# Patient Record
Sex: MALE | Race: WHITE
[De-identification: names, ages, dates, MRNs, and addresses within clinical notes are randomized per-mention and may not be internally consistent; named-entity substitution may affect disease eponyms.]

---

## 2018-05-08 NOTE — PT.OIE
Current Diagnoses

Primary osteoarthritis, left shoulder (05/07/18)
Muscle wasting and atrophy, not elsewhere classified, left shoulder (05/07/18)
Other reduced mobility (05/07/18)

Past Medical History (Last Updated 05/08/18 @ 13:13 by Florinda Denis, PT)

Back pain (Acute)
Falls (Acute)
Hearing decreased (Acute)
Neuropathy (Acute)

Past Surgical History (Last Updated 05/08/18 @ 13:12 by Florinda Denis, PT)

S/P shoulder replacement (Acute)

Physical Therapy Initial Evaluation

PT-OP-A Visit Information                             Start:  05/07/18 15:44
Freq:                                                 Status: Active        
Protocol:                                                                   
Activity Type Activity Date Activity User E-Sign Co-Sign Detail
Recorded Client Recorded Date Recorded By   
Document 05/07/18 10:40 LRN   
[id number] 05/07/18 16:15 LRN   

  05/07/18
  10:40
Out-Patient Physical Therapy Visit 
Information 
[Visit Information] 
 -Visit Type Treatment Note
 -Visit Start Time 10:37
 -Visit Stop Time 11:25
 -Total Visit Minutes 58
 -Visit Number 1
 -Number of PTA Visits 0
[Evaluation Information] 
 -Evaluation Date 05/07/18

PT-OP-C Subjective                                    Start:  05/07/18 15:44
Freq:                                                 Status: Active        
Protocol:                                                                   
Activity Type Activity Date Activity User E-Sign Co-Sign Detail
Recorded Client Recorded Date Recorded By   
Document 05/07/18 10:40 LRN   
[id number] 05/08/18 10:23 LRN   

  05/07/18
  10:40
OP-PT Subjective 
[Patient Comments] 
 -Patient Comments Doesn't really
 have pain, 4/10
 with movement
 8/10. at rest.
 Pain with
 movement
Patient Questionnaires 
[Quick Dash- Upper Extremity] 
 -Quick Dash UE Score 79.54
 -Quick Dash UE Impairment 80 to 99%
 Impaired (Score
 80-99)
OP-PT Pain Assessment 
[Pain Assessment Grid] 
 -Paper Pain Assessment Grid Completed Yes
[Location] 
 Left Shoulder 
  -Intensity 4
  -Scale Used Numeric (1 - 10
 )
  -Pain Aggravating Factors Changing
 Position
  -Patient Stated Pain Goal Regain use of
 arm

PT-OP-J Posture/Palpation/Skin                        Start:  05/07/18 15:44
Freq:                                                 Status: Active        
Protocol:                                                                   
Activity Type Activity Date Activity User E-Sign Co-Sign Detail
Recorded Client Recorded Date Recorded By   
Document 05/08/18 10:24 LRN   
[id number] 05/08/18 10:28 LRN   

  05/08/18
  10:24
Posture Evaluation 
[Position] 
 Sitting 
  -Evaluation View Anterior
  -Head/C-Spine Posture Neutral
 Position
  -Scapula Posture (L) Depressed
[Comments] 
 -Posture Comments Sitting:  Pt
 sits slumped
 with
 straightened
 thoracic spine.
 L scapula is
 tightly bound
 to the ribcage.
 There is a
 moderate
 Dowager's hump.
 Moderate
 forward head.
 Standing: hips
 flexed ~10 deg'
 s.
Palpation Assessment 
[Location] 
 Three 
  -Palpation Location Posterior L
 shoulder
  -Palpation Findings Tenderness
 Two 
  -Palpation Location Upper shoulder
  -Palpation Findings Muscle Guarding
 One 
  -Palpation Location L side neck
  -Palpation Findings Muscle Guarding
Skin Assessment 
[Incisional Assessment] 
 -Incision Appearance/Comments Unable to
 assess.  Scar
 covered with
 waterproof
 bandage (
 Aquacil).

PT-OP-K Range of Motion                               Start:  05/07/18 15:44
Freq:                                                 Status: Active        
Protocol:                                                                   
Activity Type Activity Date Activity User E-Sign Co-Sign Detail
Recorded Client Recorded Date Recorded By   
Document 05/07/18 10:40 [facility]   
[id number] 05/08/18 12:21 LRN   

  05/07/18
  10:40
Cervical Spine Range of Motion 
[Cervical Spine] 
 
[id number]|2018-08-20 14:32:42|2018-08-20 14:32:42|PT.OPPN||||

## 2018-05-10 NOTE — PT.OTN
Current Diagnoses

Primary osteoarthritis, left shoulder (05/10/18)

Physical Therapy Treatment Note

PT-OP-A Visit Information                                  Start:  05/07/18 15:44
Freq:                                                      Status: Active        
Protocol:                                                                        
 Document     05/10/18 12:56  LRN  (Rec: 05/10/18 14:34  LRN  [id number])
 Out-Patient Physical Therapy Visit Information
     Visit Information
      Visit Type                                 Treatment Note
      Visit Note                                 2/10
      Visit Start Time                           12:50
      Visit Stop Time                            14:15
      Total Visit Minutes                        85
      Visit Number                               2
      Number of PTA Visits                       0
     Evaluation Information
      Evaluation Date                            05/07/18
PT-OP-C Subjective                                         Start:  05/07/18 15:44
Freq:                                                      Status: Active        
Protocol:                                                                        
 Document     05/10/18 12:56  LRN  (Rec: 05/10/18 14:34  LRN  [id number])
 OP-PT Subjective
     Patient Comments
      Patient Comments                           Has been doing HEP 2x/day.
                                                 Only pain is in the front of
                                                 the shoulder.
PT-OP-J Posture/Palpation/Skin                             Start:  05/07/18 15:44
Freq:                                                      Status: Active        
Protocol:                                                                        
 Document     05/07/18 10:40  LRN  (Rec: 05/08/18 10:28  LRN  [id number])
 Posture Evaluation
     Position
      Sitting
       Evaluation View                           Anterior
       Head/C-Spine Posture                      Neutral Position
       Scapula Posture                           (L) Depressed
     Comments
      Posture Comments                           Sitting:  Pt sits slumped with
                                                 straightened thoracic spine.
                                                 L scapula is   tightly bound
                                                 to the ribcage. There is a
                                                 moderate Dowager's hump.
                                                 Moderate forward head.
                                                 Standing: hips flexed ~10 deg'
                                                 s.
 Palpation Assessment
     Location
      Three
       Palpation Location                        Posterior L shoulder
       Palpation Findings                        Tenderness
      Two
       Palpation Location                        Upper shoulder
       Palpation Findings                        Muscle Guarding
      One
       Palpation Location                        L side neck
       Palpation Findings                        Muscle Guarding
 Skin Assessment
     Incisional Assessment
      Incision Appearance/Comments               Unable to assess.  Scar
                                                 covered with waterproof
                                                 bandage (Aquacil).
PT-OP-K Range of Motion                                    Start:  05/07/18 15:44
Freq:                                                      Status: Active        
Protocol:                                                                        
 Document     05/10/18 12:56  LRN  (Rec: 05/10/18 14:34  LRN  [id number])
 Shoulder Goniometric Range of Motion
     Shoulder
      Measured in Degrees
       Left
        Testing Position                         Supine
        Flexion                                  40

[id number]|2018-07-02 13:27:37|2018-07-02 13:27:37|PT.OTN||||

## 2018-05-22 NOTE — PT.OTN
Current Diagnoses

Primary osteoarthritis, left shoulder (05/22/18)

Physical Therapy Treatment Note

PT-OP-A Visit Information                                  Start:  05/07/18 15:44
Freq:                                                      Status: Active        
Protocol:                                                                        
 Document     05/22/18 13:33  LRN  (Rec: 05/22/18 14:33  LRN  [id number])
 Out-Patient Physical Therapy Visit Information
     Visit Information
      Visit Type                                 Treatment Note
      Visit Note                                 4/10
      Visit Start Time                           13:33
      Visit Stop Time                            14:25
      Total Visit Minutes                        52
      Visit Number                               4
      Number of PTA Visits                       0
     Evaluation Information
      Evaluation Date                            05/07/18
PT-OP-C Subjective                                         Start:  05/07/18 15:44
Freq:                                                      Status: Active        
Protocol:                                                                        
 Document     05/22/18 13:33  LRN  (Rec: 05/22/18 14:33  LRN  [id number])
 OP-PT Subjective
     Patient Comments
      Patient Comments                           Everything is going good.  Has
                                                 no pain complaints.  Notes
                                                 clicking in the shoulder with
                                                 lifting of the arm motion.
PT-OP-J Posture/Palpation/Skin                             Start:  05/07/18 15:44
Freq:                                                      Status: Active        
Protocol:                                                                        
 Document     05/07/18 10:40  LRN  (Rec: 05/08/18 10:28  LRN  [id number])
 Posture Evaluation
     Position
      Sitting
       Evaluation View                           Anterior
       Head/C-Spine Posture                      Neutral Position
       Scapula Posture                           (L) Depressed
     Comments
      Posture Comments                           Sitting:  Pt sits slumped with
                                                 straightened thoracic spine.
                                                 L scapula is   tightly bound
                                                 to the ribcage. There is a
                                                 moderate Dowager's hump.
                                                 Moderate forward head.
                                                 Standing: hips flexed ~10 deg'
                                                 s.
 Palpation Assessment
     Location
      Three
       Palpation Location                        Posterior L shoulder
       Palpation Findings                        Tenderness
      Two
       Palpation Location                        Upper shoulder
       Palpation Findings                        Muscle Guarding
      One
       Palpation Location                        L side neck
       Palpation Findings                        Muscle Guarding
 Skin Assessment
     Incisional Assessment
      Incision Appearance/Comments               Unable to assess.  Scar
                                                 covered with waterproof
                                                 bandage (Aquacil).
PT-OP-K Range of Motion                                    Start:  05/07/18 15:44
Freq:                                                      Status: Active        
Protocol:                                                                        
 Document     05/22/18 13:33  LRN  (Rec: 05/22/18 14:33  LRN  [id number])
 Shoulder Goniometric Range of Motion
     Shoulder
      Measured in Degrees
       Left
        Testing
[id number]|2018-09-10 15:44:00|2018-09-10 15:44:00|PT.OPDS||||

## 2018-06-22 ENCOUNTER — HOSPITAL ENCOUNTER (OUTPATIENT)
Age: 83
End: 2018-06-22
Payer: MEDICARE

## 2018-06-22 DIAGNOSIS — R73.01: ICD-10-CM

## 2018-06-22 DIAGNOSIS — I10: ICD-10-CM

## 2018-06-22 DIAGNOSIS — E78.00: ICD-10-CM

## 2018-06-22 DIAGNOSIS — I48.91: Primary | ICD-10-CM

## 2018-06-22 LAB
ADD MANUAL DIFF / SLIDE REVIEW: NO
ALBUMIN SERPL-MCNC: 4.4 G/DL (ref 3.5–5)
ALBUMIN/GLOB SERPL: 1.3 {RATIO} (ref 1–2.8)
ALP SERPL-CCNC: 59 U/L (ref 38–126)
ALT SERPL-CCNC: 34 IU/L (ref 21–72)
BUN SERPL-MCNC: 22 MG/DL (ref 9–20)
CALCIUM SERPL-MCNC: 9.3 MG/DL (ref 8.4–10.2)
CHLORIDE SERPL-SCNC: 102 MMOL/L (ref 98–107)
CHOLEST SERPL-MCNC: 133 MG/DL (ref 140–199)
CO2 SERPL-SCNC: 27 MMOL/L (ref 22–32)
ESTIMATED GLOMERULAR FILT RATE: > 60 ML/MIN (ref 60–?)
GLOBULIN SER CALC-MCNC: 3.4 G/DL (ref 1.7–4.1)
GLUCOSE SERPL-MCNC: 120 MG/DL (ref 80–110)
HBA1C MFR BLD: 6.2 % (ref 4–6)
HDLC SERPL-MCNC: 62 MG/DL (ref 40–60)
HEMATOCRIT: 42.1 % (ref 41–53)
HEMOGLOBIN: 14.2 G/DL (ref 13.5–17.5)
HEMOLYSIS: < 15 (ref 0–50)
MCV RBC: 89.2 FL (ref 80–100)
MEAN CORPUSCULAR HEMOGLOBIN: 30 PG (ref 26–34)
MEAN CORPUSCULAR HGB CONC: 33.6 % (ref 30–36)
PLATELET COUNT: 193 X10^3/UL (ref 150–400)
POTASSIUM SERPL-SCNC: 4.2 MMOL/L (ref 3.4–5.1)
PROT SERPL-MCNC: 7.8 G/DL (ref 6.3–8.2)
SODIUM SERPL-SCNC: 141 MMOL/L (ref 137–145)
TRIGL SERPL-MCNC: 87 MG/DL (ref 35–150)

## 2018-06-22 PROCEDURE — 85025 COMPLETE CBC W/AUTO DIFF WBC: CPT

## 2018-06-22 PROCEDURE — 36415 COLL VENOUS BLD VENIPUNCTURE: CPT

## 2018-06-22 PROCEDURE — 80053 COMPREHEN METABOLIC PANEL: CPT

## 2018-06-22 PROCEDURE — 80061 LIPID PANEL: CPT

## 2018-06-22 PROCEDURE — 83036 HEMOGLOBIN GLYCOSYLATED A1C: CPT

## 2018-07-02 NOTE — PT.OPPOC
Current Diagnoses

Primary osteoarthritis, left shoulder (07/02/18)

Provider

Visit Care Team          Role                          Provider Type
Ángel Gonzalez MD
                         Primary Care Provider         Physician
     Specialty:     Internal Medicine
     Address:       [address] [place] [address], [address]
     Phone:         [phone]
     Fax:           [phone]
     Email:         


Del Coburn PA-C
                         Attending Provider            Advanced Practioner Clinician
     Specialty:     Orthopedic Surgery
     Address:       [address] [place] [address], [address]
     Phone:         [phone]
     Fax:           [phone]
     Email:         [email or web address]




Plan Of Care

PT-OP-T Assessment and Plan                                Start:  05/07/18 15:44
Freq:                                                      Status: Active        
Protocol:                                                                        
 Document     07/02/18 11:25  LRN  (Rec: 07/02/18 12:29  LRN  [id number])
 Physical Therapy Assessment
     Rehab Potential
      Rehabilitation Potential                   Excellent
     Impairments
      Impairments                                Activity Tolerance
                                                 Functional Activities
                                                 Functional Mobility
                                                 Pain
                                                 ROM
                                                 Soft Tissue Mobility
                                                 Strength
     Other Concerns
      Age Related Concerns                       Needs assist with ex's,
                                                 hearing difficulty, pictures
                                                 with exercises.
      Barriers to Rehabilitation                 65+ Age
     Goals
      Eight
       Impairment                                Lacks approriate HEP
       Long Term Goal (LTG)                      Pt will be independent in a
                                                 HEP
       LTG Duration                              8/20/18
      Six
       Impairment                                Decreased function
       Long Term Goal (LTG)                      UE QuickDASH Score no more
                                                 than 20% impairment.
       LTG Duration                              8/10/18
      Five
       Impairment                                Decreased functional use of L
                                                 shoulder
       Long Term Goal (LTG)                      Per protocol: Start
                                                 scapulothoracic rhythmic
                                                 strengthening/stabilization &
                                                 alternating isometrics in
                                                 supine for pt to begin use of
                                                 hand for feeding and light
                                                 activities of ADL's (dressing,
                                                 washing).
       LTG Duration                              Goal met
      Four
       Impairment                                Decreased L shoulder IR
       Long Term Goal (LTG)                      Per protocol:
                                                 PROM of IR for L shoulder not
                                                 to exceed 50 deg's.
                                                 Start ER/IR isometrics with
                                                 light weight 1-3# or light
                                                 resistive bands.
       LTG Duration                              8/10/18
      Three
       Impairment       
[id number]|2018-06-04 15:19:13|2018-06-04 15:19:13|PT.OTN||||

## 2018-07-02 NOTE — PT.OPPN
Current Diagnoses

Primary osteoarthritis, left shoulder (07/02/18)

Physical Therapy Progress Note

PT-OP-A Visit Information                                  Start:  05/07/18 15:44
Freq:                                                      Status: Active        
Protocol:                                                                        
 Document     07/02/18 11:25  LRN  (Rec: 07/02/18 12:29  LRN  [id number])
 Out-Patient Physical Therapy Visit Information
     Visit Information
      Visit Type                                 Progress Note
      Visit Note                                 10/10
      Visit Start Time                           11:25
      Visit Stop Time                            12:20
      Total Visit Minutes                        55
      Visit Number                               10
      Number of PTA Visits                       0
     Evaluation Information
      Evaluation Date                            05/07/18
PT-OP-C Subjective                                         Start:  05/07/18 15:44
Freq:                                                      Status: Active        
Protocol:                                                                        
 Document     07/02/18 11:25  LRN  (Rec: 07/02/18 12:29  LRN  [id number])
 OP-PT Subjective
     Patient Comments
      Patient Comments                           Lifted a food  that
                                                 didn't feel heavy, but caused
                                                 discomfort later.
 Patient Questionnaires
     Quick Dash- Upper Extremity
      Quick Dash UE Score                        25
      Quick Dash UE Impairment                   20 to 39% Impaired (Score 20-
                                                 39)
PT-OP-J Posture/Palpation/Skin                             Start:  05/07/18 15:44
Freq:                                                      Status: Active        
Protocol:                                                                        
 Document     05/07/18 10:40  LRN  (Rec: 05/08/18 10:28  LRN  [id number])
 Posture Evaluation
     Position
      Sitting
       Evaluation View                           Anterior
       Head/C-Spine Posture                      Neutral Position
       Scapula Posture                           (L) Depressed
     Comments
      Posture Comments                           Sitting:  Pt sits slumped with
                                                 straightened thoracic spine.
                                                 L scapula is   tightly bound
                                                 to the ribcage. There is a
                                                 moderate Dowager's hump.
                                                 Moderate forward head.
                                                 Standing: hips flexed ~10 deg'
                                                 s.
 Palpation Assessment
     Location
      Three
       Palpation Location                        Posterior L shoulder
       Palpation Findings                        Tenderness
      Two
       Palpation Location                        Upper shoulder
       Palpation Findings                        Muscle Guarding
      One
       Palpation Location                        L side neck
       Palpation Findings                        Muscle Guarding
 Skin Assessment
     Incisional Assessment
      Incision Appearance/Comments               Unable to assess.  Scar
                                                 covered with waterproof
                                                 bandage (Aquacil).
PT-OP-K Range of Motion                                    Start:  05/07/18 15:44
Freq:                                                      Status: Active        
Protocol:                                                                        
 Document     07/02/18 1
[id number]|2018-09-10 15:43:28|2018-09-10 15:43:28|PT.OTN||||

## 2018-07-09 NOTE — PT.OTN
"
Current Diagnoses

Primary osteoarthritis, left shoulder (07/09/18)

Physical Therapy Treatment Note

PT-OP-A Visit Information                                  Start:  05/07/18 15:44
Freq:                                                      Status: Active        
Protocol:                                                                        
 Document     07/09/18 11:15  LRN  (Rec: 07/09/18 11:33  LRN  [id number])
 Out-Patient Physical Therapy Visit Information
     Visit Information
      Visit Type                                 Treatment Note
      Visit Note                                 11/20
      Visit Start Time                           11:15
      Visit Stop Time                            12:05
      Total Visit Minutes                        50
      Visit Number                               11
      Number of PTA Visits                       0
     Evaluation Information
      Evaluation Date                            05/07/18
PT-OP-C Subjective                                         Start:  05/07/18 15:44
Freq:                                                      Status: Active        
Protocol:                                                                        
 Document     07/09/18 11:15  LRN  (Rec: 07/09/18 11:33  LRN  [id number])
 OP-PT Subjective
     Patient Comments
      Patient Comments                           States he stopped neck ex's
                                                 because he had sudden onset of
                                                 neck pain and had a
                                                 chiroprator friend work with
                                                 him for 3 visits to get his
                                                 pain down.  He is doing his L
                                                 arm ex's with a cane.
PT-OP-J Posture/Palpation/Skin                             Start:  05/07/18 15:44
Freq:                                                      Status: Active        
Protocol:                                                                        
 Document     05/07/18 10:40  LRN  (Rec: 05/08/18 10:28  LRN  [id number])
 Posture Evaluation
     Position
      Sitting
       Evaluation View                           Anterior
       Head/C-Spine Posture                      Neutral Position
       Scapula Posture                           (L) Depressed
     Comments
      Posture Comments                           Sitting:  Pt sits slumped with
                                                 straightened thoracic spine.
                                                 L scapula is   tightly bound
                                                 to the ribcage. There is a
                                                 moderate Dowager's hump.
                                                 Moderate forward head.
                                                 Standing: hips flexed ~10 deg'
                                                 s.
 Palpation Assessment
     Location
      Three
       Palpation Location                        Posterior L shoulder
       Palpation Findings                        Tenderness
      Two
       Palpation Location                        Upper shoulder
       Palpation Findings                        Muscle Guarding
      One
       Palpation Location                        L side neck
       Palpation Findings                        Muscle Guarding
 Skin Assessment
     Incisional Assessment
      Incision Appearance/Comments               Unable to assess.  Scar
                                                 covered with waterproof
                                                 bandage (Aquacil).
PT-OP-K Range of Motion                                    Start:  05/07/18 15:44
Freq:                                                      Status: Active        
Protocol:                                
[id number]|2018-10-11 08:53:31|2018-10-11 08:53:31|PM.HP.1||||"History of Present Illness

## 2018-07-16 NOTE — PT.OTN
Current Diagnoses

Primary osteoarthritis, left shoulder (07/16/18)

Physical Therapy Treatment Note

PT-OP-A Visit Information                                  Start:  05/07/18 15:44
Freq:                                                      Status: Active        
Protocol:                                                                        
 Document     07/16/18 11:17  LRN  (Rec: 07/16/18 12:38  LRN  [id number])
 Out-Patient Physical Therapy Visit Information
     Visit Information
      Visit Type                                 Treatment Note
      Visit Note                                 12/20
      Visit Start Time                           11:17
      Visit Stop Time                            12:07
      Total Visit Minutes                        50
      Visit Number                               12
      Number of PTA Visits                       0
     Evaluation Information
      Evaluation Date                            05/07/18
PT-OP-C Subjective                                         Start:  05/07/18 15:44
Freq:                                                      Status: Active        
Protocol:                                                                        
 Document     07/16/18 11:17  LRN  (Rec: 07/16/18 14:49  LRN  [id number])
 OP-PT Subjective
     Patient Comments
      Patient Comments                           Able to get L arm up high with
                                                 or without a cane.
PT-OP-J Posture/Palpation/Skin                             Start:  05/07/18 15:44
Freq:                                                      Status: Active        
Protocol:                                                                        
 Document     05/07/18 10:40  LRN  (Rec: 05/08/18 10:28  LRN  [id number])
 Posture Evaluation
     Position
      Sitting
       Evaluation View                           Anterior
       Head/C-Spine Posture                      Neutral Position
       Scapula Posture                           (L) Depressed
     Comments
      Posture Comments                           Sitting:  Pt sits slumped with
                                                 straightened thoracic spine.
                                                 L scapula is   tightly bound
                                                 to the ribcage. There is a
                                                 moderate Dowager's hump.
                                                 Moderate forward head.
                                                 Standing: hips flexed ~10 deg'
                                                 s.
 Palpation Assessment
     Location
      Three
       Palpation Location                        Posterior L shoulder
       Palpation Findings                        Tenderness
      Two
       Palpation Location                        Upper shoulder
       Palpation Findings                        Muscle Guarding
      One
       Palpation Location                        L side neck
       Palpation Findings                        Muscle Guarding
 Skin Assessment
     Incisional Assessment
      Incision Appearance/Comments               Unable to assess.  Scar
                                                 covered with waterproof
                                                 bandage (Aquacil).
PT-OP-K Range of Motion                                    Start:  05/07/18 15:44
Freq:                                                      Status: Active        
Protocol:                                                                        
 Document     07/16/18 11:17  LRN  (Rec: 07/16/18 12:38  LRN  [id number])
 Shoulder Goniometric Range of Motion
     Shoulder
      Measured in Degrees
       Right Active
        Testing Position                         Sitting
        Flexion                                  155
        Abduction                                180
     
[id number]|2018-07-23 13:07:32|2018-07-23 13:07:32|PT.OTN||||

## 2018-07-30 NOTE — PT.OTN
Current Diagnoses

Primary osteoarthritis, left shoulder (07/30/18)

Physical Therapy Treatment Note

PT-OP-A Visit Information                                  Start:  05/07/18 15:44
Freq:                                                      Status: Active        
Protocol:                                                                        
 Document     07/30/18 10:30  AMB  (Rec: 07/30/18 11:22  AMB  [id number])
 Out-Patient Physical Therapy Visit Information
     Visit Information
      Visit Type                                 Treatment Note
      Visit Note                                 14/20
      Visit Start Time                           10:30
      Visit Stop Time                            11:20
      Total Visit Minutes                        55
      Visit Number                               14
      Number of PTA Visits                       0
     Evaluation Information
      Evaluation Date                            05/07/18
PT-OP-C Subjective                                         Start:  05/07/18 15:44
Freq:                                                      Status: Active        
Protocol:                                                                        
 Document     07/30/18 10:30  AMB  (Rec: 07/30/18 11:22  AMB  [id number])
 OP-PT Subjective
     Patient Comments
      Patient Comments                           Pt saw MD who said he will
                                                 likely not have equal ROM, but
                                                 he should focus on getting a
                                                 HEP and gym program that he
                                                 can be independent with.
PT-OP-J Posture/Palpation/Skin                             Start:  05/07/18 15:44
Freq:                                                      Status: Active        
Protocol:                                                                        
 Document     05/07/18 10:40  LRN  (Rec: 05/08/18 10:28  LRN  [id number])
 Posture Evaluation
     Position
      Sitting
       Evaluation View                           Anterior
       Head/C-Spine Posture                      Neutral Position
       Scapula Posture                           (L) Depressed
     Comments
      Posture Comments                           Sitting:  Pt sits slumped with
                                                 straightened thoracic spine.
                                                 L scapula is   tightly bound
                                                 to the ribcage. There is a
                                                 moderate Dowager's hump.
                                                 Moderate forward head.
                                                 Standing: hips flexed ~10 deg'
                                                 s.
 Palpation Assessment
     Location
      Three
       Palpation Location                        Posterior L shoulder
       Palpation Findings                        Tenderness
      Two
       Palpation Location                        Upper shoulder
       Palpation Findings                        Muscle Guarding
      One
       Palpation Location                        L side neck
       Palpation Findings                        Muscle Guarding
 Skin Assessment
     Incisional Assessment
      Incision Appearance/Comments               Unable to assess.  Scar
                                                 covered with waterproof
                                                 bandage (Aquacil).
PT-OP-K Range of Motion                                    Start:  05/07/18 15:44
Freq:                                                      Status: Active        
Protocol:                                                                        
 Document     07/23/18 11:15  LRN  (Rec: 07/23/18 12:46  LRN  [id number])
 Shoulder Goniometric Range of Mo
[id number]|2018-06-11 16:13:55|2018-06-11 16:13:55|PT.OTN||||

## 2018-08-06 NOTE — PT.OTN
Current Diagnoses

Primary osteoarthritis, left shoulder (08/06/18)

Physical Therapy Treatment Note

PT-OP-A Visit Information                                  Start:  05/07/18 15:44
Freq:                                                      Status: Active        
Protocol:                                                                        
 Document     08/06/18 11:20  LRN  (Rec: 08/06/18 12:38  LRN  [id number])
 Out-Patient Physical Therapy Visit Information
     Visit Information
      Visit Type                                 Treatment Note
      Visit Note                                 15/20
      Visit Start Time                           11:20
      Visit Stop Time                            12:10
      Total Visit Minutes                        50
      Visit Number                               15
      Number of PTA Visits                       0
     Evaluation Information
      Evaluation Date                            05/07/18
PT-OP-C Subjective                                         Start:  05/07/18 15:44
Freq:                                                      Status: Active        
Protocol:                                                                        
 Document     08/06/18 11:20  LRN  (Rec: 08/06/18 12:38  LRN  [id number])
 OP-PT Subjective
     Patient Comments
      Patient Comments                           Is now able to put wallet into
                                                 the back pocket.  States MD
                                                 was okay with him being
                                                 supervised with ex's and to
                                                 cont PT.
PT-OP-J Posture/Palpation/Skin                             Start:  05/07/18 15:44
Freq:                                                      Status: Active        
Protocol:                                                                        
 Document     05/07/18 10:40  LRN  (Rec: 05/08/18 10:28  LRN  [id number])
 Posture Evaluation
     Position
      Sitting
       Evaluation View                           Anterior
       Head/C-Spine Posture                      Neutral Position
       Scapula Posture                           (L) Depressed
     Comments
      Posture Comments                           Sitting:  Pt sits slumped with
                                                 straightened thoracic spine.
                                                 L scapula is   tightly bound
                                                 to the ribcage. There is a
                                                 moderate Dowager's hump.
                                                 Moderate forward head.
                                                 Standing: hips flexed ~10 deg'
                                                 s.
 Palpation Assessment
     Location
      Three
       Palpation Location                        Posterior L shoulder
       Palpation Findings                        Tenderness
      Two
       Palpation Location                        Upper shoulder
       Palpation Findings                        Muscle Guarding
      One
       Palpation Location                        L side neck
       Palpation Findings                        Muscle Guarding
 Skin Assessment
     Incisional Assessment
      Incision Appearance/Comments               Unable to assess.  Scar
                                                 covered with waterproof
                                                 bandage (Aquacil).
PT-OP-K Range of Motion                                    Start:  05/07/18 15:44
Freq:                                                      Status: Active        
Protocol:                                                                        
 Document     07/23/18 11:15  LRN  (Rec: 07/23/18 12:46  LRN  [id number])
 Shoulder Goniometric Range of Motion
     Shoulder
[id number]|2018-06-20 12:48:43|2018-06-20 12:48:43|PT.OTN||||

## 2018-08-10 NOTE — PT.OTN
Current Diagnoses

Primary osteoarthritis, left shoulder (08/10/18)

Physical Therapy Treatment Note

PT-OP-A Visit Information                                  Start:  05/07/18 15:44
Freq:                                                      Status: Active        
Protocol:                                                                        
 Document     08/10/18 09:44  LRN  (Rec: 08/10/18 10:29  LRN  [id number])
 Out-Patient Physical Therapy Visit Information
     Visit Information
      Visit Type                                 Treatment Note
      Visit Note                                 16/20
      Visit Start Time                           09:44
      Visit Stop Time                            10:36
      Total Visit Minutes                        52
      Visit Number                               16
      Number of PTA Visits                       0
     Evaluation Information
      Evaluation Date                            05/07/18
PT-OP-C Subjective                                         Start:  05/07/18 15:44
Freq:                                                      Status: Active        
Protocol:                                                                        
 Document     08/10/18 09:44  LRN  (Rec: 08/10/18 10:29  LRN  [id number])
 OP-PT Subjective
     Patient Comments
      Patient Comments                           Happy to be able to seat belt
                                                 himself and put his wallet in
                                                 his back pocket
PT-OP-J Posture/Palpation/Skin                             Start:  05/07/18 15:44
Freq:                                                      Status: Active        
Protocol:                                                                        
 Document     05/07/18 10:40  LRN  (Rec: 05/08/18 10:28  LRN  [id number])
 Posture Evaluation
     Position
      Sitting
       Evaluation View                           Anterior
       Head/C-Spine Posture                      Neutral Position
       Scapula Posture                           (L) Depressed
     Comments
      Posture Comments                           Sitting:  Pt sits slumped with
                                                 straightened thoracic spine.
                                                 L scapula is   tightly bound
                                                 to the ribcage. There is a
                                                 moderate Dowager's hump.
                                                 Moderate forward head.
                                                 Standing: hips flexed ~10 deg'
                                                 s.
 Palpation Assessment
     Location
      Three
       Palpation Location                        Posterior L shoulder
       Palpation Findings                        Tenderness
      Two
       Palpation Location                        Upper shoulder
       Palpation Findings                        Muscle Guarding
      One
       Palpation Location                        L side neck
       Palpation Findings                        Muscle Guarding
 Skin Assessment
     Incisional Assessment
      Incision Appearance/Comments               Unable to assess.  Scar
                                                 covered with waterproof
                                                 bandage (Aquacil).
PT-OP-K Range of Motion                                    Start:  05/07/18 15:44
Freq:                                                      Status: Active        
Protocol:                                                                        
 Document     07/23/18 11:15  LRN  (Rec: 07/23/18 12:46  LRN  [id number])
 Shoulder Goniometric Range of Motion
     Shoulder
      Measured in Degrees
       Left Active
        Testing Position                         Sitting
        Flexion                       
[id number]|2018-06-27 16:38:15|2018-06-27 16:38:15|PT.OTN||||

## 2018-08-13 NOTE — PT.OTN
Current Diagnoses

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

Physical Therapy Treatment Note

PT-OP-A Visit Information                                  Start:  05/07/18 15:44
Freq:                                                      Status: Active        
Protocol:                                                                        
 Document     08/13/18 11:18  LRN  (Rec: 08/13/18 12:24  LRN  [id number])
 Out-Patient Physical Therapy Visit Information
     Visit Information
      Visit Type                                 Treatment Note
      Visit Note                                 17/20
      Visit Start Time                           11:18
      Visit Stop Time                            12:18
      Total Visit Minutes                        60
      Visit Number                               17
      Number of PTA Visits                       0
     Evaluation Information
      Evaluation Date                            05/07/18
PT-OP-C Subjective                                         Start:  05/07/18 15:44
Freq:                                                      Status: Active        
Protocol:                                                                        
 Document     08/13/18 11:18  LRN  (Rec: 08/13/18 12:46  LRN  [id number])
 OP-PT Subjective
     Patient Comments
      Patient Comments                           Would like to review his HEP.
                                                 Was scrapping his walls to
                                                 get ready to pain but using
                                                 his R arm only.
PT-OP-J Posture/Palpation/Skin                             Start:  05/07/18 15:44
Freq:                                                      Status: Active        
Protocol:                                                                        
 Document     05/07/18 10:40  LRN  (Rec: 05/08/18 10:28  LRN  [id number])
 Posture Evaluation
     Position
      Sitting
       Evaluation View                           Anterior
       Head/C-Spine Posture                      Neutral Position
       Scapula Posture                           (L) Depressed
     Comments
      Posture Comments                           Sitting:  Pt sits slumped with
                                                 straightened thoracic spine.
                                                 L scapula is   tightly bound
                                                 to the ribcage. There is a
                                                 moderate Dowager's hump.
                                                 Moderate forward head.
                                                 Standing: hips flexed ~10 deg'
                                                 s.
 Palpation Assessment
     Location
      Three
       Palpation Location                        Posterior L shoulder
       Palpation Findings                        Tenderness
      Two
       Palpation Location                        Upper shoulder
       Palpation Findings                        Muscle Guarding
      One
       Palpation Location                        L side neck
       Palpation Findings                        Muscle Guarding
 Skin Assessment
     Incisional Assessment
      Incision Appearance/Comments               Unable to assess.  Scar
                                                 covered with waterproof
                                                 bandage (Aquacil).
PT-OP-K Range of Motion                                    Start:  05/07/18 15:44
Freq:                                                      Status: Active        
Protocol:                                                                        
 Document     07/23/18 11:15  LRN  (Rec: 07/23/18 12:46  LRN  [id number])
 Shoulder Goniometric Range of Motion
     Shoulder
      Measured in Degrees
       Left Active
        Testing Positio
[id number]|2018-05-15 16:33:30|2018-05-15 16:33:30|PT.OTN||||

## 2018-08-20 NOTE — PT.OPPOC
Current Diagnoses

Primary osteoarthritis, left shoulder (08/20/18)

Provider

Visit Care Team          Role                          Provider Type
Ángel Gonzalez MD
                         Primary Care Provider         Physician
     Specialty:     Internal Medicine
     Address:       [address] [place] [address], [address]
     Phone:         [phone]
     Fax:           [phone]
     Email:         


Del Coburn PA-C
                         Attending Provider            Advanced Practice Clinician
     Specialty:     Orthopedic Surgery
     Address:       [address] [place] [address], [place], [place], [address]
     Phone:         [phone]
     Fax:           [phone]
     Email:         [email or web address]




Plan Of Care

PT-OP-T Assessment and Plan                                Start:  05/07/18 15:44
Freq:                                                      Status: Active        
Protocol:                                                                        
 Document     08/20/18 11:15  LRN  (Rec: 08/20/18 12:49  LRN  [id number])
 Physical Therapy Assessment
     Impairments
      Impairments                                Activity Tolerance
                                                 Functional Activities
                                                 ROM
                                                 Strength
     Other Concerns
      Age Related Concerns                       Hearing difficulty, pictures
                                                 with exercises.
      Barriers to Rehabilitation                 65+ Age
     Goals
      Eight
       Impairment                                Lacks appropriate HEP
       Long Term Goal (LTG)                      Pt will be independent in a
                                                 HEP
       LTG Duration                              8/20/18
      Six
       Impairment                                Decreased function
       Long Term Goal (LTG)                      UE QuickDASH Score no more
                                                 than 20% impairment.
       LTG Duration                              8/20/18: GOAL MET.  Score is
                                                 13% disability
      Five
       Impairment                                Decreased functional use of L
                                                 shoulder
       LTG Duration                              GOAL MET
      Four
       Impairment                                Decreased L shoulder IR
       LTG Duration                              7/16/18: GOAL MET.
      Three
       Impairment                                Decreased L shoulder strength
       Short Term Goal (STG)                     Per protocol: Pt able to lift
                                                 light weights (1-3#) in
                                                 scapular plane for flex.
                                                 PRECAUTION:   No lifting > 6#
                                                 or sudden lifting/pushing.
       STG Duration                              9/28/18
       Long Term Goal (LTG)                      Proper scapulohumeral rhythm
                                                 with lifting of L arm.
       LTG Duration                              9/28/18
      Two
       Impairment                                Decreased L shoulder AROM
       Long Term Goal (LTG)                      Shoulder AROM up to 120 deg's
                                                 and ER of 30 deg's with proper
                                                 shoulder mechanics.
       LTG Duration                              8/20/18
      One
       Impairment                                Decreased L shoulder PROM.  Pt
                                                 to be in sling 6-8 wks.
       STG Duration                    
[id number]|2018-05-29 16:09:56|2018-05-29 16:09:56|PT.OTN||||

## 2018-09-04 NOTE — PT.OTN
Current Diagnoses

Primary osteoarthritis, left shoulder (09/04/18)

Physical Therapy Treatment Note

PT-OP-A Visit Information                                  Start:  05/07/18 15:44
Freq:                                                      Status: Active        
Protocol:                                                                        
 Document     09/04/18 11:17  LRN  (Rec: 09/04/18 12:16  LRN  [id number])
 Out-Patient Physical Therapy Visit Information
     Visit Information
      Visit Type                                 Treatment Note
      Visit Note                                 19/20
      Visit Start Time                           11:17
      Visit Stop Time                            12:07
      Total Visit Minutes                        50
      Visit Number                               19
      Number of PTA Visits                       0
     Evaluation Information
      Evaluation Date                            05/07/18
PT-OP-C Subjective                                         Start:  05/07/18 15:44
Freq:                                                      Status: Active        
Protocol:                                                                        
 Document     09/04/18 11:17  LRN  (Rec: 09/04/18 12:16  LRN  [id number])
 OP-PT Subjective
     Patient Comments
      Patient Comments                           Will be painting and sanding
                                                 with R arm.  Was able to do
                                                 some gardening work  (hedge
                                                 trimming)  was lighter than I
                                                 thought it would be. 
PT-OP-J Posture/Palpation/Skin                             Start:  05/07/18 15:44
Freq:                                                      Status: Active        
Protocol:                                                                        
 Document     05/07/18 10:40  LRN  (Rec: 05/08/18 10:28  LRN  [id number])
 Posture Evaluation
     Position
      Sitting
       Evaluation View                           Anterior
       Head/C-Spine Posture                      Neutral Position
       Scapula Posture                           (L) Depressed
     Comments
      Posture Comments                           Sitting:  Pt sits slumped with
                                                 straightened thoracic spine.
                                                 L scapula is   tightly bound
                                                 to the ribcage. There is a
                                                 moderate Dowager's hump.
                                                 Moderate forward head.
                                                 Standing: hips flexed ~10 deg'
                                                 s.
 Palpation Assessment
     Location
      Three
       Palpation Location                        Posterior L shoulder
       Palpation Findings                        Tenderness
      Two
       Palpation Location                        Upper shoulder
       Palpation Findings                        Muscle Guarding
      One
       Palpation Location                        L side neck
       Palpation Findings                        Muscle Guarding
 Skin Assessment
     Incisional Assessment
      Incision Appearance/Comments               Unable to assess.  Scar
                                                 covered with waterproof
                                                 bandage (Aquacil).
PT-OP-K Range of Motion                                    Start:  05/07/18 15:44
Freq:                                                      Status: Active        
Protocol:                                                                        
 Document     07/23/18 11:15  LRN  (Rec: 07/23/18 12:46  LRN  [id number])
 Shoulder Goniometric Range of Motio
[id number]|2018-05-08 13:18:00|2018-05-08 13:18:00|PT.OPPOC||||

## 2018-09-10 ENCOUNTER — HOSPITAL ENCOUNTER (OUTPATIENT)
Dept: HOSPITAL 73 - PHYS | Age: 83
LOS: 31 days | End: 2018-10-11
Payer: MEDICARE

## 2018-09-10 DIAGNOSIS — M19.012: Primary | ICD-10-CM

## 2018-09-10 PROCEDURE — 97010 HOT OR COLD PACKS THERAPY: CPT

## 2018-09-10 PROCEDURE — 97162 PT EVAL MOD COMPLEX 30 MIN: CPT

## 2018-09-10 PROCEDURE — 97535 SELF CARE MNGMENT TRAINING: CPT

## 2018-09-10 PROCEDURE — 97110 THERAPEUTIC EXERCISES: CPT

## 2018-09-10 PROCEDURE — 97140 MANUAL THERAPY 1/> REGIONS: CPT

## 2018-12-20 ENCOUNTER — HOSPITAL ENCOUNTER (OUTPATIENT)
Age: 83
End: 2018-12-20
Payer: MEDICARE

## 2018-12-20 DIAGNOSIS — I10: ICD-10-CM

## 2018-12-20 DIAGNOSIS — E11.9: Primary | ICD-10-CM

## 2018-12-20 LAB
BUN SERPL-MCNC: 23 MG/DL (ref 9–20)
CALCIUM SERPL-MCNC: 9.5 MG/DL (ref 8.4–10.2)
CHLORIDE SERPL-SCNC: 101 MMOL/L (ref 98–107)
CO2 SERPL-SCNC: 28 MMOL/L (ref 22–32)
ESTIMATED GLOMERULAR FILT RATE: > 60 ML/MIN (ref 60–?)
GLUCOSE SERPL-MCNC: 135 MG/DL (ref 80–110)
HBA1C MFR BLD: 6.8 % (ref 4–6)
HEMOLYSIS: < 15 (ref 0–50)
POTASSIUM SERPL-SCNC: 4.6 MMOL/L (ref 3.4–5.1)
SODIUM SERPL-SCNC: 140 MMOL/L (ref 137–145)

## 2018-12-20 PROCEDURE — 80048 BASIC METABOLIC PNL TOTAL CA: CPT

## 2018-12-20 PROCEDURE — 83036 HEMOGLOBIN GLYCOSYLATED A1C: CPT

## 2018-12-20 PROCEDURE — 36415 COLL VENOUS BLD VENIPUNCTURE: CPT

## 2019-04-08 ENCOUNTER — HOSPITAL ENCOUNTER (OUTPATIENT)
Age: 84
End: 2019-04-08
Payer: MEDICARE

## 2019-04-08 DIAGNOSIS — Z95.2: ICD-10-CM

## 2019-04-08 DIAGNOSIS — I07.1: Primary | ICD-10-CM

## 2019-04-08 DIAGNOSIS — I37.1: ICD-10-CM

## 2019-04-08 PROCEDURE — 93306 TTE W/DOPPLER COMPLETE: CPT

## 2019-04-08 NOTE — DI.ECHO.S_ITS
"                                    [facility]  
+---------+                        Hospital                        +---------+  
:         :                      [address]                     :         :  
:         :                      [address]                     :         :  
:         :                          [id number]                         :         :  
:         :                       Phone: [phone]-                      :         :  
+---------+                        [phone]                        +---------+  
                             Echocardiogram Report  
+-----------------------------------------------------------------------------+  
:Name: BEL VALLES          Study Date: 2019        Height: 64 in  :  
:[facility] MRN #: [id number]      Exam Location: [facility]            Weight: 176 lb :  
:Account #: [id number]           Gender: Male                  BSA: 1.9 m2    :  
:: 1935                 Age: 83 yrs                   BP: 120/80 mmHg:  
:Reason For Study: Post Mitral valve reapair                                  :  
:Ordering Physician: Blossom                                                    :  
:Jose                         Performed By: Sheri Page                     :  
+-----------------------------------------------------------------------------+  
Interpretation Summary  
The left ventricle is normal in size.  
The ejection fraction is estimated to be 50-55%.  
There has been no significant change in LV EF since the previous study.  
E/E' med: 30.6. Previously it was 25.2.  
   
The right ventricle is mildly dilated.  
Right ventricular systolic function is mildly reduced.  
   
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  
No significant mitral valve stenosis.  
There is trace mitral regurgitation.  
   
There is mild tricuspid regurgitation.  
Compared to the prior echo exam, there has been no change in TR severity.  
The right ventricular systolic pressure is estimated to be at least 16 mmHg  
based on an estimated right atrial pressure of 3 mm Hg.  
   
   
Procedure:   A two-dimensional transthoracic echocardiogram with color flow  
and Doppler was performed. The study quality was technically adequate.  
Comparison is made with the echocardiogram of 2015. The heart rate ranged  
between 53-71 bpm during the study. The patient was in atrial fibrillation  
with heart rates between 53-71 bpm during the exam. The patient had a bundle  
branch block rhythm during the exam.  
Left Ventricle:   The left ventricle is normal in size. Proximal septal  
thickening is noted. There is no echo evidence for significant left  
ventricular outflow tract obstruction. The ejection fraction is estimated to  
be 50-55%. There has been no significant change since the previous study.  
Septal motion is consistent with conduction abnormality. E/E' med: 30.6.  
Right Ventricle:   The right ventricle is mildly dilated. Right ventricular  
systolic function is mildly reduced.  
Atria:   The left atrium is moderately dilated. The left atrium has mildly  
increased in size since the prior echo exam. Right atrial size is normal.  
There is no Doppler evidence for an interatrial shunt.  
Mitral Valve:   An annuloplasty ring is noted in the mitral position. The  
mitral valve mean gradient is 4.7 mmHg. No significant mitral valve stenosis.  
There is trace mitral regurgitation. Compared to the prior echo study, there  
has been no change in the severity of mitral regurgitation.  
Aortic Valve:   The aortic valve is trileaflet. The aortic valve opens well.  
The aortic valve is slightly calcified. There is no aortic valve stenosis.  
There is trace aortic regurgitation.  
Tricuspid Valve:   The tricuspid valve is normal. There is mild tricuspid  
regurgitation. The right ventricular systolic pressure is estimated to be at  
least 16 mmHg based on an estimated right atrial pressure of 3 mm Hg. Compared  
to the prior echo exa
[id number]|2019 14:59:00|2019 15:15:00|DI.ECHO.S_ITS|[facility]|Imaging|[id number]|"                                    [facility]

## 2020-08-28 ENCOUNTER — HOSPITAL ENCOUNTER (OUTPATIENT)
Age: 85
End: 2020-08-28
Payer: MEDICARE

## 2020-08-28 DIAGNOSIS — I10: ICD-10-CM

## 2020-08-28 DIAGNOSIS — E11.9: Primary | ICD-10-CM

## 2020-08-28 LAB
BUN SERPL-MCNC: 31 MG/DL (ref 9–20)
CALCIUM SERPL-MCNC: 9.7 MG/DL (ref 8.4–10.2)
CHLORIDE SERPL-SCNC: 103 MMOL/L (ref 98–107)
CO2 SERPL-SCNC: 29 MMOL/L (ref 22–32)
ESTIMATED GLOMERULAR FILT RATE: > 60 ML/MIN (ref 60–?)
GLUCOSE SERPL-MCNC: 149 MG/DL (ref 80–110)
HBA1C MFR BLD: 7 % (ref 4–6)
HEMOLYSIS: < 15 (ref 0–50)
POTASSIUM SERPL-SCNC: 4.9 MMOL/L (ref 3.4–5.1)
SODIUM SERPL-SCNC: 139 MMOL/L (ref 137–145)

## 2020-08-28 PROCEDURE — 83036 HEMOGLOBIN GLYCOSYLATED A1C: CPT

## 2020-08-28 PROCEDURE — 36415 COLL VENOUS BLD VENIPUNCTURE: CPT

## 2020-08-28 PROCEDURE — 80048 BASIC METABOLIC PNL TOTAL CA: CPT

## 2021-01-07 ENCOUNTER — HOSPITAL ENCOUNTER (OUTPATIENT)
Age: 86
End: 2021-01-07
Payer: MEDICARE

## 2021-01-07 DIAGNOSIS — R06.00: ICD-10-CM

## 2021-01-07 DIAGNOSIS — I10: ICD-10-CM

## 2021-01-07 DIAGNOSIS — E11.9: Primary | ICD-10-CM

## 2021-01-07 LAB
ADD MANUAL DIFF / SLIDE REVIEW: NO
BUN SERPL-MCNC: 22 MG/DL (ref 9–20)
CALCIUM SERPL-MCNC: 9.5 MG/DL (ref 8.4–10.2)
CHLORIDE SERPL-SCNC: 103 MMOL/L (ref 98–107)
CO2 SERPL-SCNC: 31 MMOL/L (ref 22–32)
ESTIMATED GLOMERULAR FILT RATE: > 60 ML/MIN (ref 60–?)
GLUCOSE SERPL-MCNC: 144 MG/DL (ref 80–110)
HEMATOCRIT: 41.5 % (ref 41–53)
HEMOGLOBIN: 14 G/DL (ref 13.5–17.5)
HEMOLYSIS: < 15 (ref 0–50)
LYMPHOCYTES # SPEC AUTO: 1800 /UL (ref 1100–4500)
MCV RBC: 88.3 FL (ref 80–100)
MEAN CORPUSCULAR HEMOGLOBIN: 29.9 PG (ref 26–34)
MEAN CORPUSCULAR HGB CONC: 33.8 % (ref 30–36)
NT-PROBNP SERPL-MCNC: 102 PG/ML (ref ?–450)
PLATELET COUNT: 168 X10^3/UL (ref 150–400)
POTASSIUM SERPL-SCNC: 4.4 MMOL/L (ref 3.4–5.1)
SODIUM SERPL-SCNC: 138 MMOL/L (ref 137–145)

## 2021-01-07 PROCEDURE — 83880 ASSAY OF NATRIURETIC PEPTIDE: CPT

## 2021-01-07 PROCEDURE — 85025 COMPLETE CBC W/AUTO DIFF WBC: CPT

## 2021-01-07 PROCEDURE — 36415 COLL VENOUS BLD VENIPUNCTURE: CPT

## 2021-01-07 PROCEDURE — 80048 BASIC METABOLIC PNL TOTAL CA: CPT

## 2021-01-27 ENCOUNTER — HOSPITAL ENCOUNTER (OUTPATIENT)
Age: 86
End: 2021-01-27
Payer: MEDICARE

## 2021-01-27 DIAGNOSIS — I08.2: Primary | ICD-10-CM

## 2021-01-27 DIAGNOSIS — R06.00: ICD-10-CM

## 2021-01-27 PROCEDURE — 93306 TTE W/DOPPLER COMPLETE: CPT

## 2021-01-27 NOTE — DI.ECHO.S_ITS
"                                    [facility]  
+---------+                        Hospital                        +---------+  
:         :                      [address]                     :         :  
:         :                      [address]                     :         :  
:         :                          [id number]                         :         :  
:         :                       Phone: [phone]-                      :         :  
+---------+                        [phone]                        +---------+  
                             Echocardiogram Report  
+-----------------------------------------------------------------------------+  
:Name: BEL VALLES         Study Date: 2021         Height: 67 in  :  
:[facility] MRN #: [id number]     ReadingLocation:               Weight: 174 lb :  
:Account #: [id number]          Gender: Male                   BSA: 1.9 m2    :  
:: 1935                Age: 85 yrs                    BP: 142/95 mmHg:  
:Reason For Study: DYSPNEA                                                    :  
:Ordering Physician: LEANNE,                                                  :  
:JUDITH                          Performed By: Abby Bazan                    :  
:Referring: JUDITH PERDOMO                                                     :  
+-----------------------------------------------------------------------------+  
Interpretation Summary  
Atrial flutter with variable block.  
Normal LV size and wall thickness. Normal wall motion and LV systolic  
function. EF is 55-60%.  
   
Mild LA enlargement. Otherwise normal chamber sizes.  
   
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 There is no  
associated mitral valve dysfunction.  
   
Aortic valve leaflets are mildly thickened and calcified. There is aortic  
sclerosis without stenosis.  
   
Compared to prior study 2019 no changes have occurred.  
   
Procedure:   A two-dimensional transthoracic echocardiogram with color flow  
and Doppler was performed. The study quality was technically adequate.  
Comparison is made with the echocardiogram of 2019. The heart rate  
ranged between 54-76 bpm during the study.  
Left Ventricle:   The left ventricle is normal in size. Proximal septal  
thickening is noted. The ejection fraction is estimated to be 55-60%.  
Diastolic function could not be accurately assessed due to atrial  
fibrillation.  
Right Ventricle:   The right ventricle is mildly dilated. Right ventricular  
systolic function is moderately reduced.  
Atria:   The left atrium is mildly dilated. Right atrial size is normal. There  
is no Doppler evidence for an interatrial shunt.  
Mitral Valve:   An annuloplasty ring is noted in the mitral position. The  
mitral valve mean gradient is 3.91 mmHg. There is trace mitral regurgitation.  
Aortic Valve:   The aortic valve is mildly calcified. There is mild aortic  
valve sclerosis. There is no aortic valve stenosis. No aortic regurgitation is  
present.  
Tricuspid Valve:   The tricuspid valve is normal in structure and function.  
There is mild tricuspid regurgitation. The right ventricular systolic pressure  
is estimated to be at least 21 mmHg based on an estimated right atrial  
pressure of 3 mm Hg.  
Pulmonic Valve:   The pulmonic valve leaflets are thin and pliable; valve  
motion is normal. There is mild pulmonic regurgitation.  
Great Vessels:   The aortic root is normal size. The dimensions of the  
ascending aorta are normal. The IVC is of normal diameter and collapses  
greater than 50% with a sniff. This suggests a low right atrial pressure of 3  
mm Hg.  
Pericardium/ Pleura   There is no pericardial effusion. There is no pleural  
effusion.  
   
MMode/2D Measurements & Calculations  
LVIDd: 4.1 cm                            LVOT diam: 2.3 cm  
LVIDs: 2.7 cm                            Ao root diam: 3.7 cm  
FS: 33.2 %                               asc Aor
[id number]|2021 12:59:00|2021 12:59:00|P.OP_ITS|RUTHANN|Health Information Management|[id number]|"Operative Date/Time/Diagnoses

## 2021-03-11 ENCOUNTER — HOSPITAL ENCOUNTER (OUTPATIENT)
Age: 86
End: 2021-03-11
Payer: MEDICARE

## 2021-03-11 DIAGNOSIS — Z23: Primary | ICD-10-CM

## 2021-03-11 PROCEDURE — 91303: CPT

## 2021-03-11 PROCEDURE — 0031A: CPT

## 2021-05-23 ENCOUNTER — HOSPITAL ENCOUNTER (EMERGENCY)
Age: 86
Discharge: HOME | End: 2021-05-23
Payer: MEDICARE

## 2021-05-23 VITALS — DIASTOLIC BLOOD PRESSURE: 73 MMHG | HEART RATE: 69 BPM | OXYGEN SATURATION: 96 % | SYSTOLIC BLOOD PRESSURE: 128 MMHG

## 2021-05-23 VITALS — HEART RATE: 68 BPM | OXYGEN SATURATION: 96 %

## 2021-05-23 VITALS — HEART RATE: 73 BPM | OXYGEN SATURATION: 97 %

## 2021-05-23 VITALS — SYSTOLIC BLOOD PRESSURE: 126 MMHG | DIASTOLIC BLOOD PRESSURE: 64 MMHG | OXYGEN SATURATION: 96 % | HEART RATE: 75 BPM

## 2021-05-23 VITALS — BODY MASS INDEX: 29.2 KG/M2

## 2021-05-23 VITALS
SYSTOLIC BLOOD PRESSURE: 137 MMHG | DIASTOLIC BLOOD PRESSURE: 89 MMHG | TEMPERATURE: 98.24 F | RESPIRATION RATE: 15 BRPM | OXYGEN SATURATION: 96 % | HEART RATE: 74 BPM

## 2021-05-23 DIAGNOSIS — S01.81XA: Primary | ICD-10-CM
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
## 2021-05-23 DIAGNOSIS — Z23: ICD-10-CM

## 2021-05-23 DIAGNOSIS — R79.1: ICD-10-CM

## 2021-05-23 DIAGNOSIS — Z79.01: ICD-10-CM

## 2021-05-23 LAB
INR PPP: 6.5 (ref 0.9–1.3)
PROTHROMBIN TIME: 77.8 SECONDS (ref 10.1–12.7)

## 2021-05-23 PROCEDURE — 99284 EMERGENCY DEPT VISIT MOD MDM: CPT

## 2021-05-23 PROCEDURE — 71045 X-RAY EXAM CHEST 1 VIEW: CPT

## 2021-05-23 PROCEDURE — 90471 IMMUNIZATION ADMIN: CPT

## 2021-05-23 PROCEDURE — 70450 CT HEAD/BRAIN W/O DYE: CPT

## 2021-05-23 PROCEDURE — 99283 EMERGENCY DEPT VISIT LOW MDM: CPT

## 2021-05-23 PROCEDURE — 85610 PROTHROMBIN TIME: CPT

## 2021-05-23 PROCEDURE — 36415 COLL VENOUS BLD VENIPUNCTURE: CPT

## 2021-12-03 ENCOUNTER — HOSPITAL ENCOUNTER (OUTPATIENT)
Age: 86
End: 2021-12-03
Payer: MEDICARE

## 2021-12-03 DIAGNOSIS — I48.91: ICD-10-CM

## 2021-12-03 DIAGNOSIS — E78.5: Primary | ICD-10-CM

## 2021-12-03 LAB
CHOLEST SERPL-MCNC: 160 MG/DL (ref 140–199)
HDLC SERPL-MCNC: 59 MG/DL (ref 40–60)
HEMATOCRIT: 42.6 % (ref 41–53)
HEMOGLOBIN: 14.3 G/DL (ref 13.5–17.5)
MCV RBC: 89.2 FL (ref 80–100)
MEAN CORPUSCULAR HEMOGLOBIN: 29.8 PG (ref 26–34)
MEAN CORPUSCULAR HGB CONC: 33.4 % (ref 30–36)
PLATELET COUNT: 181 X10^3/UL (ref 150–400)
TRIGL SERPL-MCNC: 188 MG/DL (ref 35–150)

## 2021-12-03 PROCEDURE — 36415 COLL VENOUS BLD VENIPUNCTURE: CPT

## 2021-12-03 PROCEDURE — 85027 COMPLETE CBC AUTOMATED: CPT

## 2021-12-03 PROCEDURE — 80061 LIPID PANEL: CPT

## 2022-06-05 ENCOUNTER — HOSPITAL ENCOUNTER (OUTPATIENT)
Age: 87
End: 2022-06-05
Payer: MEDICARE

## 2022-06-05 DIAGNOSIS — Z20.822: Primary | ICD-10-CM

## 2022-06-05 PROCEDURE — 87635 SARS-COV-2 COVID-19 AMP PRB: CPT

## 2022-07-01 ENCOUNTER — HOSPITAL ENCOUNTER (OUTPATIENT)
Age: 87
End: 2022-07-01
Payer: MEDICARE

## 2022-07-01 DIAGNOSIS — Z53.20: ICD-10-CM

## 2022-07-01 DIAGNOSIS — R41.3: Primary | ICD-10-CM

## 2022-08-05 ENCOUNTER — HOSPITAL ENCOUNTER (OUTPATIENT)
Age: 87
End: 2022-08-05
Payer: MEDICARE

## 2022-08-05 DIAGNOSIS — R41.3: Primary | ICD-10-CM

## 2022-08-05 PROCEDURE — 70551 MRI BRAIN STEM W/O DYE: CPT

## 2022-11-08 ENCOUNTER — HOSPITAL ENCOUNTER (OUTPATIENT)
Age: 87
End: 2022-11-08
Payer: MEDICARE

## 2022-11-08 DIAGNOSIS — I48.21: Primary | ICD-10-CM

## 2022-11-08 LAB
INR PPP: 2.1 (ref 0.9–1.3)
PROTHROMBIN TIME: 24 SECONDS (ref 10.1–12.7)

## 2022-11-08 PROCEDURE — 36415 COLL VENOUS BLD VENIPUNCTURE: CPT

## 2022-11-08 PROCEDURE — 85610 PROTHROMBIN TIME: CPT

## 2023-07-05 ENCOUNTER — HOSPITAL ENCOUNTER (OUTPATIENT)
Age: 88
End: 2023-07-05
Payer: MEDICARE

## 2023-07-05 DIAGNOSIS — E78.5: Primary | ICD-10-CM

## 2023-07-05 LAB
ADD MANUAL DIFF / SLIDE REVIEW: NO
ALBUMIN SERPL-MCNC: 4.5 G/DL (ref 3.5–5)
ALBUMIN/GLOB SERPL: 1.5 {RATIO} (ref 1–2.8)
ALP SERPL-CCNC: 65 U/L (ref 38–126)
ALT SERPL-CCNC: 22 IU/L (ref ?–50)
BUN SERPL-MCNC: 29 MG/DL (ref 9–20)
CALCIUM SERPL-MCNC: 9.3 MG/DL (ref 8.4–10.2)
CHLORIDE SERPL-SCNC: 102 MMOL/L (ref 98–107)
CHOLEST SERPL-MCNC: 161 MG/DL (ref 140–199)
CO2 SERPL-SCNC: 28 MMOL/L (ref 22–32)
ESTIMATED GLOMERULAR FILT RATE: > 60 ML/MIN (ref 60–?)
GLOBULIN SER CALC-MCNC: 3.1 G/DL (ref 1.7–4.1)
GLUCOSE SERPL-MCNC: 114 MG/DL (ref 80–110)
HDLC SERPL-MCNC: 56 MG/DL (ref 40–60)
HEMATOCRIT: 40.5 % (ref 41–53)
HEMOGLOBIN: 13.7 G/DL (ref 13.5–17.5)
HEMOLYSIS: < 15 (ref 0–50)
LYMPHOCYTES # SPEC AUTO: 1600 /UL (ref 1100–4500)
MCV RBC: 89.5 FL (ref 80–100)
MEAN CORPUSCULAR HEMOGLOBIN: 30.2 PG (ref 26–34)
MEAN CORPUSCULAR HGB CONC: 33.8 % (ref 30–36)
PLATELET COUNT: 168 X10^3/UL (ref 150–400)
POTASSIUM SERPL-SCNC: 4.5 MMOL/L (ref 3.4–5.1)
PROT SERPL-MCNC: 7.6 G/DL (ref 6.3–8.2)
SODIUM SERPL-SCNC: 138 MMOL/L (ref 137–145)
TRIGL SERPL-MCNC: 265 MG/DL (ref 35–150)

## 2023-07-05 PROCEDURE — 80061 LIPID PANEL: CPT

## 2023-07-05 PROCEDURE — 80053 COMPREHEN METABOLIC PANEL: CPT

## 2023-07-05 PROCEDURE — 36415 COLL VENOUS BLD VENIPUNCTURE: CPT

## 2023-07-05 PROCEDURE — 85025 COMPLETE CBC W/AUTO DIFF WBC: CPT

## 2023-08-21 ENCOUNTER — HOSPITAL ENCOUNTER (OUTPATIENT)
Age: 88
End: 2023-08-21
Payer: MEDICARE

## 2023-08-21 DIAGNOSIS — Z98.890: Primary | ICD-10-CM

## 2023-08-21 DIAGNOSIS — I08.3: ICD-10-CM

## 2023-08-21 PROCEDURE — 93306 TTE W/DOPPLER COMPLETE: CPT

## 2024-09-16 ENCOUNTER — HOSPITAL ENCOUNTER (EMERGENCY)
Age: 89
Discharge: HOME | End: 2024-09-16
Payer: MEDICARE

## 2024-09-16 VITALS
SYSTOLIC BLOOD PRESSURE: 172 MMHG | RESPIRATION RATE: 14 BRPM | OXYGEN SATURATION: 99 % | DIASTOLIC BLOOD PRESSURE: 80 MMHG | TEMPERATURE: 97.2 F | HEART RATE: 60 BPM

## 2024-09-16 VITALS — BODY MASS INDEX: 28.3 KG/M2

## 2024-09-16 DIAGNOSIS — Z79.01: ICD-10-CM
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
## 2024-09-16 DIAGNOSIS — I48.91: ICD-10-CM

## 2024-09-16 DIAGNOSIS — S09.92XA: Primary | ICD-10-CM

## 2024-09-16 PROCEDURE — 70450 CT HEAD/BRAIN W/O DYE: CPT

## 2024-09-16 PROCEDURE — 99283 EMERGENCY DEPT VISIT LOW MDM: CPT

## 2024-09-16 PROCEDURE — 99284 EMERGENCY DEPT VISIT MOD MDM: CPT

## 2024-09-16 PROCEDURE — 70486 CT MAXILLOFACIAL W/O DYE: CPT

## 2024-09-16 NOTE — ED_ITS
HPI - Fall    
<Sherly Moss PA-C - Last Filed: 09/16/24 18:20>    
General    
Chief Complaint: Fall    
Stated Complaint: GLF, On Thinners    
Time Seen by Provider: 09/16/24 17:33    
Source: patient    
Mode of arrival: Ambulatory    
History of Present Illness    
HPI Narrative:     
89-year-old male on Eliquis for atrial fibrillation presents to the ED status   
post a mechanical fall sustained just prior to arrival.  Patient states he   
tripped, fell forward, struck his nose on a dresser.  No loss of consciousness.   
Patient denies feeling unwell or lightheaded prior to the fall.  Patient   
endorses some soreness at the bridge of the nose.  No other injuries.    
Related Data    
                                Home Medications    
    
    
    
 Medication  Instructions  Recorded  Confirmed    
     
donepezil 10 mg tablet 5 mg PO BEDTIME 05/01/18 06/05/22    
     
fenofibrate nanocrystallized 145 145 mg PO DAILY 05/01/18 06/05/22    
    
mg tablet (Tricor)       
     
metoprolol succinate 25 mg 12.5 mg PO DAILY 05/01/18 06/05/22    
    
tablet,extended release 24 hr       
     
multivitamin with folic acid 400 1 tab PO DAILY 05/01/18 06/05/22    
    
mcg tablet       
     
potassium gluconate 595 mg (99 mg) 99 mg PO DAILY 05/01/18 06/05/22    
    
tablet       
     
simvastatin 40 mg tablet 40 mg PO QPM 05/01/18 06/05/22    
     
trazodone 50 mg tablet 50 mg PO BEDTIME 05/01/18 06/05/22    
     
vitamin E 268 mg (400 unit) capsule 400 unit PO DAILY 05/01/18 06/05/22    
     
Respironcis Dramstation CPAP #1 ea 05/16/19 06/05/22    
     
warfarin 4 mg tablet 4 mg PO DAILY 09/01/21 06/05/22    
    
    
    
                                    Allergies    
    
    
    
Allergy/AdvReac Type Severity Reaction Status Date / Time    
     
No Known Drug Allergies Allergy   Verified 09/16/24 17:30    
    
    
    
    
Review of Systems    
<Sherly Moss PA-C - Last Filed: 09/16/24 18:20>    
Constitutional    
Constitutional: Denies chills, Denies fatigue, Denies fever(s), Denies frequent   
falls, Denies lethargy and Denies weakness    
Eyes    
Eyes: Denies change in vision, Denies eye discharge, Denies irritation and   
Denies loss of vision    
ENT    
Ears, Nose, Mouth, and Throat: Denies change in voice, Denies dizziness, Denies   
neck pain, Denies sore throat and Denies throat swelling    
Comments:     
Soreness at the bridge of the nose    
Cardiovascular    
Cardiovascular: Denies chest pain, Denies irregular heart rhythm, Denies   
lightheadedness, Denies palpitations, Denies dyspnea, Denies dyspnea on exertion  
and Denies orthopnea    
Respiratory    
Respiratory: Denies cough, Denies dyspnea, Denies dyspnea on exertion and Denies  
wheezing    
Gastrointestinal    
Gastrointestinal: Denies abdominal pain, Denies change in bowel habits, Denies   
diarrhea, Denies nausea and Denies vomiting    
Musculoskeletal    
Musculoskeletal: Denies neck pain and Denies numbness    
Integumentary/Breasts    
Skin/Breast: Denies pruritus, Denies erythema, Denies rash and Denies wounds    
Neurologic    
Neurologic: Denies behavioral changes, Denies confusion, Denies dizziness,   
Denies frequent falls, Denies loss of vision, Denies numbness and Denies   
weakness    
Psychiatric    
Psychiatric: Denies anxiety, Denies behavioral changes, Denies confusion, Denies  
depression, Denies homicidal ideation and Denies suicidal ideation    
Endocrine    
Endocrine: Denies fatigue, Denies flushing and Denies palpitations    
Hematologic/Lymphatic    
Hematologic/Lymphatic: Denies easy bruising    
Allergic/Immunologic    
Allergic/Immunologic: Denies urticaria, Denies throat swelling and Denies   
wheezing    
    
Patient History    
<Sherly Moss PA-C - Last Filed: 09/16/24 18:20>    
Medical History (Reviewed 09/16/24 @ 18:19 by Sherly Moss PA-C)    
    
Medical device associated with adverse incidents    
Right bundle branch block (RBBB)    
Mild cognitive impairment    
Macular degeneration    
BPH (benign prostatic hyperplasia)    
Hyperlipidemia    
Hypertension    
Obstructive sleep apnea of adult    
Atrial fibrillation and flutter    
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    
Back pain    
Hearing decreased    
Neuropathy    
Falls    
    
    
Surgical History (Reviewed 09/16/24 @ 18:19 by Sherly Moss PA-C)    
    
H/O mitral valve replacement    
S/P shoulder replacement    
    
    
Social History (Reviewed 09/16/24 @ 18:19 by Sherly Moss PA-C)    
household members:  spouse     
Smoking Status:  Former smoker     
    
    
Smoking Status: Former smoker    
alcohol intake frequency: holidays/special occasions only    
Alcohol type: wine    
Substance Use Type: does not use    
    
Exam    
<Sherly Moss PA-C - Last Filed: 09/16/24 18:20>    
Narrative    
Exam Narrative:     
    
Const    
General:?cooperative, healthy appearing and comfortable    
HENMT    
Head:?normal to inspection    
 Ears:?hearing grossly normal bilaterally    
 Nose:? There is a small abrasion/red spot at the bridge of the nose which is   
tender to palpation.  No bleeding inside the nares.  No septal hematoma.    
 Face and sinus:?normal facial exam and sinuses nontender    
 Mouth:?oral mucosae normal    
 Throat:?posterior oropharynx normal    
Eyes    
General:?appearance normal, both eyes and all related structures    
Neck    
Neck:?normal visual inspection and no lymphadenopathy noted    
Resp    
Effort & Inspection:?normal respiratory effort    
 Auscultation:?clear to auscultation bilaterally    
Cardio    
Rate:?regular rate    
 Rhythm:?regular rhythm    
Neuro    
General:?patient alert, patient awake and patient oriented x3    
Initial Vital Signs    
Initial Vital Signs:     
    
Vital Signs    
    
    
    
Temperature  97.2 F L  09/16/24 17:27    
     
Pulse Rate  60   09/16/24 17:27    
     
Respiratory Rate  14   09/16/24 17:27    
     
Blood Pressure  172/80 H  09/16/24 17:27    
     
Pulse Oximetry  99   09/16/24 17:27    
     
Oxygen Delivery Method  Room Air  09/16/24 17:27    
    
    
    
    
    
<DO Mary Mccartney Last Filed: 09/16/24 18:54>    
Initial Vital Signs    
Initial Vital Signs:     
    
Vital Signs    
    
    
    
Temperature  97.2 F L  09/16/24 17:27    
     
Pulse Rate  60   09/16/24 17:27    
     
Respiratory Rate  14   09/16/24 17:27    
     
Blood Pressure  172/80 H  09/16/24 17:27    
     
Pulse Oximetry  99   09/16/24 17:27    
     
Oxygen Delivery Method  Room Air  09/16/24 17:27    
    
    
    
    
    
Course    
<Sherly Moss PA-C - Last Filed: 09/16/24 18:20>    
Orders    
Ordered:     
    
                                    ED Orders    
    
09/16/24 17:33    
CT facial bones wo con Stat     
CT head/brain wo con Stat     
    
    
    
    
Vital Signs    
Vital signs:     
    
                               Vital Signs - 8 hr    
    
    
    
 09/16/24    
17:27    
     
Temperature 97.2 F L    
     
Pulse Rate 60    
     
Respiratory Rate 14    
     
Blood Pressure 172/80 H    
     
Pulse Oximetry 99    
     
Oxygen Delivery Method Room Air    
    
    
    
    
    
<DO Mary Mccartney Last Filed: 09/16/24 18:54>    
Orders    
Ordered:     
    
                                    ED Orders    
    
09/16/24 17:33    
CT facial bones wo con Stat     
CT head/brain wo con Stat     
    
    
    
    
Vital Signs    
Vital signs:     
    
                               Vital Signs - 8 hr    
    
    
    
 09/16/24    
17:27    
     
Temperature 97.2 F L    
     
Pulse Rate 60    
     
Respiratory Rate 14    
     
Blood Pressure 172/80 H    
     
Pulse Oximetry 99    
     
Oxygen Delivery Method Room Air    
    
    
    
    
    
MDM - Fall    
<Sherly Moss PA-C - Last Filed: 09/16/24 18:20>    
[facility] Narrative    
Medical decision making narrative:     
89-year-old male on Eliquis for atrial fibrillation presents to the ED status   
post a mechanical fall sustained just prior to arrival.  Concern for   
intracranial hemorrhage versus fracture/dislocation versus other.  Obtained CT   
head and CT facial bones which were without acute findings.  Discussed findings   
with patient.  Patient agrees to monitor symptoms and return to the ED if sympt  
oms worsen.    
    
Medical records reviewed: Yes    
    
<Del Mcintosh DO - Last Filed: 09/16/24 18:54>    
[facility] Narrative    
Medical decision making narrative:     
89-year-old male on Eliquis for atrial fibrillation presents to the ED status   
post a mechanical fall sustained just prior to arrival.  Concern for intracrani  
al hemorrhage versus fracture/dislocation versus other.  Obtained CT head and CT  
facial bones which were without acute findings.  Discussed findings with   
patient.  Patient agrees to monitor symptoms and return to the ED if symptoms   
worsen.    
    
Medical records reviewed: Yes    
    
Dr. Mcintosh: I was immediately available in the department for consultation.   
Documentation has been reviewed. I agree with assessment and plan.    
    
Discharge Plan    
Departure    
Patient Disposition: Home    
    
Clinical Impression:    
Fall    
Qualifiers:    
 Encounter type: initial encounter Qualified Code(s): W19.XXXA - Unspecified   
fall, initial encounter    
    
    
Instructions:  How to Prevent Falls    
    
Activity Restrictions/Additional Instructions:    
You were evaluated in the ED today for an injury to the nose from a fall.  Your   
head CT and facial CT were both normal.  Please continue to monitor your   
symptoms and return to the ED for symptoms worsen.    
    
Prescriptions:    
No Action    
  (DME) Respironcis Dramstation CPAP       
       Qty: 1     
   Dose Instruction:    
   As directed    
   Patient Comments:    
   Pressure: 9-16 cmH2O    
   DME: [facility]    
   Rx Instructions:    
   As directed    
  trazodone 50 mg Tablet     
   50 mg PO BEDTIME     
  donepezil 10 mg Tablet     
   5 mg PO BEDTIME     
  simvastatin 40 mg Tablet     
   40 mg PO QPM     
  metoprolol succinate 25 mg Tablet Extended Release 24 Hr     
   12.5 mg PO DAILY     
  vitamin E 400 unit Capsule     
   400 unit PO DAILY     
  fenofibrate nanocrystallized [Tricor] 145 mg Tablet     
   145 mg PO DAILY     
  potassium gluconate 595 mg (99 mg) Tablet     
   99 mg PO DAILY     
  multivitamin with folic acid 400 mcg Tablet     
   1 tab PO DAILY     
  warfarin 4 mg tablet     
   4 mg PO DAILY     
    
Referrals:    
Kaushik Mason MD [Primary Care Provider] -     
    
Stand Alone Forms:  Patient Portal/API

## 2024-09-16 NOTE — DI.CT.S_ITS
PROCEDURE:  CT HEAD/BRAIN WO CON 
  
INDICATIONS:  GLF 
  
TECHNIQUE:   
Noncontrast 4.5 mm thick angled axial sections acquired from the foramen magnum to the  
vertex, with coronal and sagittal reformats.  For radiation dose reduction, the following  
was used:  automated exposure control, adjustment of mA and/or kV according to patient  
size.   
  
COMPARISON:  [facility], CT, CT HEAD/BRAIN WO CON, 5/23/2021, 13:23. 
  
FINDINGS:   
Image quality:  Diagnostic.   
  
CSF spaces:  Basal cisterns are patent.  No extra-axial fluid collections.  The  
ventricles are symmetric in size and shape.   
  
Brain:  No intracranial bleeds or masses.  There is cerebral volume loss for age, with  
resultant ventricular and sulcal prominence.  There are periventricular and deep white  
matter chronic small vessel ischemic changes.  There is intracranial internal carotid  
artery atherosclerosis.   
  
Skull and face:  Calvarium and visualized facial bones appear intact, without suspicious  
lesions.   
  
Sinuses:  Visualized sinuses and mastoids are clear.   
  
  
IMPRESSION:   
  
No acute intracranial pathology. 
  
  
Dictated by: Gamal Posadas M.D. on 9/16/2024 at 17:51      
Approved by: Gamal Posadas M.D. on 9/16/2024 at 17:56

## 2024-09-16 NOTE — ED.FALL
HPI - Fall
<Sherly Moss PA-C - Last Filed: 09/16/24 18:20>
General
Chief Complaint: Fall
Stated Complaint: GLF, On Thinners
Time Seen by Provider: 09/16/24 17:33
Source: patient
Mode of arrival: Ambulatory
History of Present Illness
HPI Narrative: 
89-year-old male on Eliquis for atrial fibrillation presents to the ED status post a mechanical fall sustained just prior to arrival.  Patient states he tripped, fell forward, struck his nose on a dresser.  No loss of consciousness.  Patient denies 
feeling unwell or lightheaded prior to the fall.  Patient endorses some soreness at the bridge of the nose.  No other injuries.
Related Data
Home Medications

 Medication  Instructions  Recorded  Confirmed
donepezil 10 mg tablet 5 mg PO BEDTIME 05/01/18 06/05/22
fenofibrate nanocrystallized 145 145 mg PO DAILY 05/01/18 06/05/22
mg tablet (Tricor)   
metoprolol succinate 25 mg 12.5 mg PO DAILY 05/01/18 06/05/22
tablet,extended release 24 hr   
multivitamin with folic acid 400 1 tab PO DAILY 05/01/18 06/05/22
mcg tablet   
potassium gluconate 595 mg (99 mg) 99 mg PO DAILY 05/01/18 06/05/22
tablet   
simvastatin 40 mg tablet 40 mg PO QPM 05/01/18 06/05/22
trazodone 50 mg tablet 50 mg PO BEDTIME 05/01/18 06/05/22
vitamin E 268 mg (400 unit) capsule 400 unit PO DAILY 05/01/18 06/05/22
Respironcis Dramstation CPAP #1 ea 05/16/19 06/05/22
warfarin 4 mg tablet 4 mg PO DAILY 09/01/21 06/05/22


Allergies

Allergy/AdvReac Type Severity Reaction Status Date / Time
No Known Drug Allergies Allergy   Verified 09/16/24 17:30



Review of Systems
<Sherly Moss PA-C - Last Filed: 09/16/24 18:20>
Constitutional
Constitutional: Denies chills, Denies fatigue, Denies fever(s), Denies frequent falls, Denies lethargy and Denies weakness
Eyes
Eyes: Denies change in vision, Denies eye discharge, Denies irritation and Denies loss of vision
ENT
Ears, Nose, Mouth, and Throat: Denies change in voice, Denies dizziness, Denies neck pain, Denies sore throat and Denies throat swelling
Comments: 
Soreness at the bridge of the nose
Cardiovascular
Cardiovascular: Denies chest pain, Denies irregular heart rhythm, Denies lightheadedness, Denies palpitations, Denies dyspnea, Denies dyspnea on exertion and Denies orthopnea
Respiratory
Respiratory: Denies cough, Denies dyspnea, Denies dyspnea on exertion and Denies wheezing
Gastrointestinal
Gastrointestinal: Denies abdominal pain, Denies change in bowel habits, Denies diarrhea, Denies nausea and Denies vomiting
Musculoskeletal
Musculoskeletal: Denies neck pain and Denies numbness
Integumentary/Breasts
Skin/Breast: Denies pruritus, Denies erythema, Denies rash and Denies wounds
Neurologic
Neurologic: Denies behavioral changes, Denies confusion, Denies dizziness, Denies frequent falls, Denies loss of vision, Denies numbness and Denies weakness
Psychiatric
Psychiatric: Denies anxiety, Denies behavioral changes, Denies confusion, Denies depression, Denies homicidal ideation and Denies suicidal ideation
Endocrine
Endocrine: Denies fatigue, Denies flushing and Denies palpitations
Hematologic/Lymphatic
Hematologic/Lymphatic: Denies easy bruising
Allergic/Immunologic
Allergic/Immunologic: Denies urticaria, Denies throat swelling and Denies wheezing

Patient History
<Sherly Moss PA-C - Last Filed: 09/16/24 18:20>
Medical History (Reviewed 09/16/24 @ 18:19 by Sherly Moss PA-C)

Medical device associated with adverse incidents
Right bundle branch block (RBBB)
Mild cognitive impairment
Macular degeneration
BPH (benign prostatic hyperplasia)
Hyperlipidemia
Hypertension
Obstructive sleep apnea of adult
Atrial fibrillation and flutter
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
Back pain
Hearing decreased
Neuropathy
Falls


Surgical History (Reviewed 09/16/24 @ 18:19 by Sherly Moss PA-C)

H/O mitral valve replacement
S/P shoulder replacement


Social History (Reviewed 09/16/24 @ 18:19 by Sherly Moss PA-C)
household members:  spouse 
Smoking Status:  Former smoker 


Smoking Status: Former smoker
alcohol intake frequency: holidays/special occasions only
Alcohol type: wine
Substance Use Type: does not use

Exam
<Sherly Moss PA-C - Last Filed: 09/16/24 18:20>
Narrative
Exam Narrative: 

Const
General:?cooperative, healthy appearing and comfortable
HENMT
Head:?normal to inspection
 Ears:?hearing grossly normal bilaterally
 Nose:? There is a small abrasion/red spot at the bridge of the nose which is tender to palpation.  No bleeding inside the nares.  No septal hematoma.
 Face and sinus:?normal facial exam and sinuses nontender
 Mouth:?oral mucosae normal
 Throat:?posterior oropharynx normal
Eyes
General:?appearance normal, both eyes and all related structures
Neck
Neck:?normal visual inspection and no lymphadenopathy noted
Resp
Effort & Inspection:?normal respiratory effort
 Auscultation:?clear to auscultation bilaterally
Cardio
Rate:?regular rate
 Rhythm:?regular rhythm
Neuro
General:?patient alert, patient awake and patient oriented x3
Initial Vital Signs
Initial Vital Signs: 

Vital Signs

Temperature  97.2 F L  09/16/24 17:27
Pulse Rate  60   09/16/24 17:27
Respiratory Rate  14   09/16/24 17:27
Blood Pressure  172/80 H  09/16/24 17:27
Pulse Oximetry  99   09/16/24 17:27
Oxygen Delivery Method  Room Air  09/16/24 17:27




<DO Mary Mccartney Last Filed: 09/16/24 18:54>
Initial Vital Signs
Initial Vital Signs: 

Vital Signs

Temperature  97.2 F L  09/16/24 17:27
Pulse Rate  60   09/16/24 17:27
Respiratory Rate  14   09/16/24 17:27
Blood Pressure  172/80 H  09/16/24 17:27
Pulse Oximetry  99   09/16/24 17:27
Oxygen Delivery Method  Room Air  09/16/24 17:27




Course
<Sherly Moss PA-C - Last Filed: 09/16/24 18:20>
Orders
Ordered: 

ED Orders

09/16/24 17:33
CT facial bones wo con Stat 
CT head/brain wo con Stat 




Vital Signs
Vital signs: 

Vital Signs - 8 hr

 09/16/24
17:27
Temperature 97.2 F L
Pulse Rate 60
Respiratory Rate 14
Blood Pressure 172/80 H
Pulse Oximetry 99
Oxygen Delivery Method Room Air




<DO Mary Mccartney Last Filed: 09/16/24 18:54>
Orders
Ordered: 

ED Orders

09/16/24 17:33
CT facial bones wo con Stat 
CT head/brain wo con Stat 




Vital Signs
Vital signs: 

Vital Signs - 8 hr

 09/16/24
17:27
Temperature 97.2 F L
Pulse Rate 60
Respiratory Rate 14
Blood Pressure 172/80 H
Pulse Oximetry 99
Oxygen Delivery Method Room Air




MDM - Fall
<Sherly Moss PA-C - Last Filed: 09/16/24 18:20>
[facility] Narrative
Medical decision making narrative: 
89-year-old male on Eliquis for atrial fibrillation presents to the ED status post a mechanical fall sustained just prior to arrival.  Concern for intracranial hemorrhage versus fracture/dislocation versus other.  Obtained CT head and CT facial 
bones which were without acute findings.  Discussed findings with patient.  Patient agrees to monitor symptoms and return to the ED if symptoms worsen.

Medical records reviewed: Yes

<Del Mcintosh DO - Last Filed: 09/16/24 18:54>
[facility] Narrative
Medical decision making narrative: 
89-year-old male on Eliquis for atrial fibrillation presents to the ED status post a mechanical fall sustained just prior to arrival.  Concern for intracranial hemorrhage versus fracture/dislocation versus other.  Obtained CT head and CT facial 
bones which were without acute findings.  Discussed findings with patient.  Patient agrees to monitor symptoms and return to the ED if symptoms worsen.

Medical records reviewed: Yes

Dr. Mcintosh: I was immediately available in the department for consultation. Documentation has been reviewed. I agree with assessment and plan.

Discharge Plan
Departure
Patient Disposition: Home

Clinical Impression:
Fall
Qualifiers:
 Encounter type: initial encounter Qualified Code(s): W19.XXXA - Unspecified fall, initial encounter


Instructions:  How to Prevent Falls

Activity Restrictions/Additional Instructions:
You were evaluated in the ED today for an injury to the nose from a fall.  Your head CT and facial CT were both normal.  Please continue to monitor your symptoms and return to the ED for symptoms worsen.

Prescriptions:
No Action
  (DME) Respironcis Dramstation CPAP   
       Qty: 1 
   Dose Instruction:
   As directed
   Patient Comments:
   Pressure: 9-16 cmH2O
   DME: [facility]
   Rx Instructions:
   As directed
  trazodone 50 mg Tablet 
   50 mg PO BEDTIME 
  donepezil 10 mg Tablet 
   5 mg PO BEDTIME 
  simvastatin 40 mg Tablet 
   40 mg PO QPM 
  metoprolol succinate 25 mg Tablet Extended Release 24 Hr 
   12.5 mg PO DAILY 
  vitamin E 400 unit Capsule 
   400 unit PO DAILY 
  fenofibrate nanocrystallized [Tricor] 145 mg Tablet 
   145 mg PO DAILY 
  potassium gluconate 595 mg (99 mg) Tablet 
   99 mg PO DAILY 
  multivitamin with folic acid 400 mcg Tablet 
   1 tab PO DAILY 
  warfarin 4 mg tablet 
   4 mg PO DAILY 

Referrals:
Isabella,Kaushik W, MD [Primary Care Provider] - 

Stand Alone Forms:  Patient Portal/API

## 2024-09-16 NOTE — DI.CT.S_ITS
IPROCEDURE:  CT FACIAL BONES WO CON 
  
INDICATIONS:  GLF 
  
TECHNIQUE:   
Noncontrast 2.5 mm thick axial images acquired from the mandible through the frontal  
sinuses, with coronal and sagittal reformatting.  For radiation dose reduction, the  
following was used:  automated exposure control, adjustment of mA and/or kV according to  
patient size.   
  
COMPARISON:  [facility], CT, CT HEAD/BRAIN WO CON, 5/23/2021, 13:23. 
  
FINDINGS:   
Image quality:  Excellent.   
  
Bones and teeth:  Orbital walls are intact.  Sinus walls show no fracture or deformity.   
Nasal bones and septum are intact.  Visualized portions of the mandible demonstrate no  
fractures or subluxation.  Zygomatic arches are intact.  Pterygoid plates are intact.   
Visualized portions of the skull base and auditory canals are intact.   
  
Sinuses:  Paranasal sinuses are aerated, without fluid levels, mucosal thickening, or  
mucoceles.  Mastoid air cells are aerated.   
  
Soft tissues:  No edema, masses, or fluid collections.  No enlarged lymph nodes.  No soft  
tissue lacerations or debris.   
  
Vascular:  Visualized vascular structures appear normal in the absence of contrast.  Bony  
vascular foramina and canals are intact.   
  
IMPRESSION:  No acute facial bone fractures. 
  
  
Dictated by: Gamal Posadas M.D. on 9/16/2024 at 17:57      
Approved by: Gamal Posadas M.D. on 9/16/2024 at 18:03

## 2024-12-31 ENCOUNTER — HOSPITAL ENCOUNTER (OUTPATIENT)
Age: 89
End: 2024-12-31
Payer: MEDICARE

## 2024-12-31 DIAGNOSIS — R35.0: Primary | ICD-10-CM

## 2024-12-31 LAB
APPEARANCE UR: CLEAR
BILIRUBIN URINE UA: NEGATIVE
COLOR UR: YELLOW
GLUCOSE URINE UA: NEGATIVE G/DL
HGB UR QL: NEGATIVE
KETONES URINE UA: NEGATIVE
LEUKOCYTE ESTERASE URINE UA: NEGATIVE
NITRITE URINE UA: NEGATIVE
PH UR: 5.5 [PH] (ref 4.5–8)
PROTEIN URINE UA: NEGATIVE
SP GR UR: >=1.03 (ref 1–1.03)
UROBILINOGEN UR QL: 0.2 E.U./DL

## 2024-12-31 PROCEDURE — 81001 URINALYSIS AUTO W/SCOPE: CPT

## 2025-06-05 ENCOUNTER — HOSPITAL ENCOUNTER (OUTPATIENT)
Age: 89
End: 2025-06-05
Payer: MEDICARE

## 2025-06-05 DIAGNOSIS — I48.92: ICD-10-CM

## 2025-06-05 DIAGNOSIS — I34.0: Primary | ICD-10-CM

## 2025-06-05 LAB
ADD MANUAL DIFF / SLIDE REVIEW: NO
ALBUMIN SERPL-MCNC: 4.6 G/DL (ref 3.5–5)
ALBUMIN/GLOB SERPL: 1.7 {RATIO} (ref 1–2.8)
ALP SERPL-CCNC: 61 U/L (ref 38–126)
ALT SERPL-CCNC: 16 IU/L (ref ?–50)
AST SERPL-CCNC: 23 IU/L (ref 17–59)
BASOPHILS # BLD AUTO: 100 /UL (ref 0–100)
BASOPHILS NFR BLD AUTO: 1.3 % (ref 0–2)
BILIRUB SERPL-MCNC: 0.5 MG/DL (ref 0.2–1.3)
BUN SERPL-MCNC: 22 MG/DL (ref 9–20)
BUN/CREAT SERPL: 22.2 (ref 6–22)
CALCIUM SERPL-MCNC: 9.6 MG/DL (ref 8.4–10.2)
CHLORIDE SERPL-SCNC: 104 MMOL/L (ref 98–107)
CO2 SERPL-SCNC: 26 MMOL/L (ref 22–32)
CREAT SERPL-MCNC: 0.99 MG/DL (ref 0.66–1.25)
EOSINOPHIL # BLD AUTO: 100 /UL (ref 0–450)
EOSINOPHIL NFR BLD AUTO: 2 % (ref 2–4)
ESTIMATED GLOMERULAR FILT RATE: > 60 ML/MIN (ref 60–?)
GLOBULIN SER CALC-MCNC: 2.7 G/DL (ref 1.7–4.1)
GLUCOSE SERPL-MCNC: 155 MG/DL (ref 70–99)
HEMATOCRIT: 42.9 % (ref 41–53)
HEMOGLOBIN: 14.4 G/DL (ref 13.5–17.5)
HEMOLYSIS: < 15 (ref 0–50)
LYMPHOCYTES # SPEC AUTO: 1500 /UL (ref 1100–4500)
LYMPHOCYTES PERCENT AUTO: 21.1 % (ref 25–40)
MCH RBC QN AUTO: 30.4 PG (ref 26–34)
MCV RBC: 90.6 FL (ref 80–100)
MEAN CORPUSCULAR HGB CONC: 33.6 % (ref 30–36)
MONOCYTES # BLD AUTO: 600 /UL (ref 0–900)
MONOCYTES NFR BLD AUTO: 8.4 % (ref 3–14)
NEUTROPHILS # BLD AUTO: 4600 /UL (ref 1500–7000)
NEUTROPHILS NFR BLD AUTO: 67.2 % (ref 50–75)
PLATELET COUNT: 193 X10^3/UL (ref 150–400)
POTASSIUM SERPL-SCNC: 4.2 MMOL/L (ref 3.4–5.1)
PROT SERPL-MCNC: 7.3 G/DL (ref 6.3–8.2)
RED BLOOD CELL COUNT: 4.73 X10^6/UL (ref 4.5–5.9)
RED CELL DISTRIBUTION WIDTH: 14.2 % (ref 11.6–14.8)
SODIUM SERPL-SCNC: 140 MMOL/L (ref 137–145)
WHITE BLOOD CELL COUNT: 6.9 X10^3/UL (ref 4.5–11)

## 2025-06-05 PROCEDURE — 80053 COMPREHEN METABOLIC PANEL: CPT

## 2025-06-05 PROCEDURE — 85025 COMPLETE CBC W/AUTO DIFF WBC: CPT

## 2025-06-05 PROCEDURE — 36415 COLL VENOUS BLD VENIPUNCTURE: CPT

## 2025-06-09 ENCOUNTER — HOSPITAL ENCOUNTER (EMERGENCY)
Age: 89
Discharge: ANOTHER HOSPITAL | End: 2025-06-09
Payer: MEDICARE

## 2025-06-09 VITALS
DIASTOLIC BLOOD PRESSURE: 73 MMHG | SYSTOLIC BLOOD PRESSURE: 154 MMHG | HEART RATE: 83 BPM | RESPIRATION RATE: 12 BRPM | OXYGEN SATURATION: 97 %

## 2025-06-09 VITALS
SYSTOLIC BLOOD PRESSURE: 97 MMHG | OXYGEN SATURATION: 93 % | DIASTOLIC BLOOD PRESSURE: 50 MMHG | HEART RATE: 57 BPM | RESPIRATION RATE: 16 BRPM

## 2025-06-09 VITALS
DIASTOLIC BLOOD PRESSURE: 61 MMHG | OXYGEN SATURATION: 98 % | SYSTOLIC BLOOD PRESSURE: 122 MMHG | HEART RATE: 72 BPM | RESPIRATION RATE: 18 BRPM

## 2025-06-09 VITALS — SYSTOLIC BLOOD PRESSURE: 153 MMHG | DIASTOLIC BLOOD PRESSURE: 87 MMHG | OXYGEN SATURATION: 98 % | HEART RATE: 85 BPM

## 2025-06-09 VITALS
OXYGEN SATURATION: 96 % | DIASTOLIC BLOOD PRESSURE: 75 MMHG | HEART RATE: 83 BPM | RESPIRATION RATE: 17 BRPM | SYSTOLIC BLOOD PRESSURE: 169 MMHG

## 2025-06-09 VITALS — OXYGEN SATURATION: 96 % | HEART RATE: 79 BPM | RESPIRATION RATE: 15 BRPM

## 2025-06-09 VITALS
OXYGEN SATURATION: 96 % | HEART RATE: 72 BPM | RESPIRATION RATE: 14 BRPM | SYSTOLIC BLOOD PRESSURE: 124 MMHG | DIASTOLIC BLOOD PRESSURE: 61 MMHG

## 2025-06-09 VITALS
RESPIRATION RATE: 18 BRPM | SYSTOLIC BLOOD PRESSURE: 117 MMHG | DIASTOLIC BLOOD PRESSURE: 63 MMHG | OXYGEN SATURATION: 97 % | TEMPERATURE: 97.5 F | HEART RATE: 69 BPM

## 2025-06-09 VITALS
RESPIRATION RATE: 16 BRPM | DIASTOLIC BLOOD PRESSURE: 72 MMHG | SYSTOLIC BLOOD PRESSURE: 158 MMHG | OXYGEN SATURATION: 97 % | HEART RATE: 81 BPM

## 2025-06-09 VITALS
HEART RATE: 80 BPM | RESPIRATION RATE: 12 BRPM | SYSTOLIC BLOOD PRESSURE: 129 MMHG | OXYGEN SATURATION: 97 % | DIASTOLIC BLOOD PRESSURE: 60 MMHG

## 2025-06-09 VITALS
OXYGEN SATURATION: 98 % | HEART RATE: 75 BPM | DIASTOLIC BLOOD PRESSURE: 63 MMHG | RESPIRATION RATE: 11 BRPM | SYSTOLIC BLOOD PRESSURE: 134 MMHG

## 2025-06-09 VITALS
SYSTOLIC BLOOD PRESSURE: 121 MMHG | HEART RATE: 82 BPM | RESPIRATION RATE: 19 BRPM | DIASTOLIC BLOOD PRESSURE: 63 MMHG | OXYGEN SATURATION: 95 %

## 2025-06-09 VITALS
HEART RATE: 77 BPM | OXYGEN SATURATION: 95 % | SYSTOLIC BLOOD PRESSURE: 118 MMHG | DIASTOLIC BLOOD PRESSURE: 63 MMHG | RESPIRATION RATE: 19 BRPM

## 2025-06-09 VITALS
RESPIRATION RATE: 15 BRPM | DIASTOLIC BLOOD PRESSURE: 64 MMHG | SYSTOLIC BLOOD PRESSURE: 135 MMHG | HEART RATE: 79 BPM | OXYGEN SATURATION: 97 %

## 2025-06-09 VITALS
SYSTOLIC BLOOD PRESSURE: 118 MMHG | HEART RATE: 82 BPM | DIASTOLIC BLOOD PRESSURE: 56 MMHG | RESPIRATION RATE: 16 BRPM | OXYGEN SATURATION: 97 %

## 2025-06-09 VITALS
OXYGEN SATURATION: 98 % | HEART RATE: 74 BPM | RESPIRATION RATE: 16 BRPM | DIASTOLIC BLOOD PRESSURE: 84 MMHG | SYSTOLIC BLOOD PRESSURE: 118 MMHG

## 2025-06-09 VITALS — HEART RATE: 86 BPM | RESPIRATION RATE: 44 BRPM | OXYGEN SATURATION: 100 %

## 2025-06-09 VITALS
HEART RATE: 79 BPM | DIASTOLIC BLOOD PRESSURE: 57 MMHG | SYSTOLIC BLOOD PRESSURE: 114 MMHG | OXYGEN SATURATION: 96 % | RESPIRATION RATE: 16 BRPM

## 2025-06-09 VITALS
HEART RATE: 80 BPM | RESPIRATION RATE: 9 BRPM | SYSTOLIC BLOOD PRESSURE: 136 MMHG | OXYGEN SATURATION: 98 % | DIASTOLIC BLOOD PRESSURE: 62 MMHG

## 2025-06-09 VITALS
RESPIRATION RATE: 22 BRPM | DIASTOLIC BLOOD PRESSURE: 43 MMHG | HEART RATE: 56 BPM | OXYGEN SATURATION: 95 % | SYSTOLIC BLOOD PRESSURE: 104 MMHG

## 2025-06-09 VITALS
SYSTOLIC BLOOD PRESSURE: 118 MMHG | RESPIRATION RATE: 19 BRPM | OXYGEN SATURATION: 94 % | DIASTOLIC BLOOD PRESSURE: 61 MMHG | HEART RATE: 78 BPM

## 2025-06-09 VITALS
DIASTOLIC BLOOD PRESSURE: 71 MMHG | OXYGEN SATURATION: 98 % | HEART RATE: 74 BPM | SYSTOLIC BLOOD PRESSURE: 158 MMHG | RESPIRATION RATE: 12 BRPM

## 2025-06-09 VITALS
SYSTOLIC BLOOD PRESSURE: 120 MMHG | OXYGEN SATURATION: 97 % | RESPIRATION RATE: 13 BRPM | HEART RATE: 81 BPM | DIASTOLIC BLOOD PRESSURE: 61 MMHG

## 2025-06-09 VITALS
OXYGEN SATURATION: 97 % | RESPIRATION RATE: 9 BRPM | DIASTOLIC BLOOD PRESSURE: 65 MMHG | SYSTOLIC BLOOD PRESSURE: 136 MMHG | HEART RATE: 71 BPM

## 2025-06-09 VITALS
DIASTOLIC BLOOD PRESSURE: 65 MMHG | SYSTOLIC BLOOD PRESSURE: 149 MMHG | HEART RATE: 70 BPM | RESPIRATION RATE: 18 BRPM | OXYGEN SATURATION: 98 %

## 2025-06-09 VITALS
DIASTOLIC BLOOD PRESSURE: 58 MMHG | SYSTOLIC BLOOD PRESSURE: 129 MMHG | OXYGEN SATURATION: 95 % | RESPIRATION RATE: 19 BRPM | HEART RATE: 79 BPM

## 2025-06-09 VITALS — OXYGEN SATURATION: 96 % | RESPIRATION RATE: 19 BRPM | HEART RATE: 79 BPM

## 2025-06-09 VITALS
HEART RATE: 70 BPM | RESPIRATION RATE: 16 BRPM | OXYGEN SATURATION: 98 % | DIASTOLIC BLOOD PRESSURE: 62 MMHG | SYSTOLIC BLOOD PRESSURE: 133 MMHG

## 2025-06-09 VITALS
DIASTOLIC BLOOD PRESSURE: 59 MMHG | OXYGEN SATURATION: 98 % | SYSTOLIC BLOOD PRESSURE: 123 MMHG | HEART RATE: 78 BPM | RESPIRATION RATE: 17 BRPM

## 2025-06-09 VITALS
SYSTOLIC BLOOD PRESSURE: 123 MMHG | RESPIRATION RATE: 17 BRPM | DIASTOLIC BLOOD PRESSURE: 59 MMHG | OXYGEN SATURATION: 94 % | HEART RATE: 80 BPM

## 2025-06-09 VITALS
SYSTOLIC BLOOD PRESSURE: 144 MMHG | DIASTOLIC BLOOD PRESSURE: 57 MMHG | HEART RATE: 75 BPM | OXYGEN SATURATION: 98 % | RESPIRATION RATE: 17 BRPM

## 2025-06-09 VITALS
DIASTOLIC BLOOD PRESSURE: 60 MMHG | RESPIRATION RATE: 13 BRPM | SYSTOLIC BLOOD PRESSURE: 113 MMHG | HEART RATE: 69 BPM | OXYGEN SATURATION: 95 %

## 2025-06-09 VITALS
DIASTOLIC BLOOD PRESSURE: 66 MMHG | OXYGEN SATURATION: 97 % | HEART RATE: 71 BPM | SYSTOLIC BLOOD PRESSURE: 126 MMHG | RESPIRATION RATE: 9 BRPM

## 2025-06-09 VITALS
OXYGEN SATURATION: 96 % | RESPIRATION RATE: 15 BRPM | SYSTOLIC BLOOD PRESSURE: 105 MMHG | HEART RATE: 73 BPM | DIASTOLIC BLOOD PRESSURE: 62 MMHG

## 2025-06-09 VITALS — BODY MASS INDEX: 27.4 KG/M2

## 2025-06-09 VITALS
OXYGEN SATURATION: 94 % | SYSTOLIC BLOOD PRESSURE: 107 MMHG | DIASTOLIC BLOOD PRESSURE: 58 MMHG | HEART RATE: 80 BPM | RESPIRATION RATE: 17 BRPM

## 2025-06-09 VITALS
DIASTOLIC BLOOD PRESSURE: 57 MMHG | OXYGEN SATURATION: 98 % | RESPIRATION RATE: 11 BRPM | HEART RATE: 74 BPM | SYSTOLIC BLOOD PRESSURE: 112 MMHG

## 2025-06-09 VITALS
HEART RATE: 89 BPM | SYSTOLIC BLOOD PRESSURE: 149 MMHG | OXYGEN SATURATION: 97 % | DIASTOLIC BLOOD PRESSURE: 93 MMHG | RESPIRATION RATE: 23 BRPM

## 2025-06-09 VITALS
RESPIRATION RATE: 13 BRPM | OXYGEN SATURATION: 98 % | DIASTOLIC BLOOD PRESSURE: 63 MMHG | SYSTOLIC BLOOD PRESSURE: 134 MMHG | HEART RATE: 72 BPM

## 2025-06-09 VITALS — RESPIRATION RATE: 20 BRPM | OXYGEN SATURATION: 96 %

## 2025-06-09 VITALS
DIASTOLIC BLOOD PRESSURE: 60 MMHG | OXYGEN SATURATION: 95 % | HEART RATE: 70 BPM | RESPIRATION RATE: 14 BRPM | SYSTOLIC BLOOD PRESSURE: 114 MMHG

## 2025-06-09 DIAGNOSIS — R07.9: ICD-10-CM

## 2025-06-09 DIAGNOSIS — Z95.2: ICD-10-CM
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
## 2025-06-09 DIAGNOSIS — R55: Primary | ICD-10-CM

## 2025-06-09 DIAGNOSIS — I48.91: ICD-10-CM

## 2025-06-09 DIAGNOSIS — Z79.01: ICD-10-CM

## 2025-06-09 DIAGNOSIS — S09.90XA: ICD-10-CM

## 2025-06-09 LAB
ADD MANUAL DIFF / SLIDE REVIEW: NO
ALBUMIN SERPL-MCNC: 4.4 G/DL (ref 3.5–5)
ALBUMIN/GLOB SERPL: 1.5 {RATIO} (ref 1–2.8)
ALP SERPL-CCNC: 61 U/L (ref 38–126)
ALT SERPL-CCNC: 18 IU/L (ref ?–50)
AST SERPL-CCNC: 30 IU/L (ref 17–59)
BASOPHILS # BLD AUTO: 100 /UL (ref 0–100)
BASOPHILS NFR BLD AUTO: 1.2 % (ref 0–2)
BILIRUB SERPL-MCNC: 0.8 MG/DL (ref 0.2–1.3)
BUN SERPL-MCNC: 27 MG/DL (ref 9–20)
BUN/CREAT SERPL: 27.3 (ref 6–22)
CALCIUM SERPL-MCNC: 9.4 MG/DL (ref 8.4–10.2)
CHLORIDE SERPL-SCNC: 106 MMOL/L (ref 98–107)
CK SERPL-CCNC: 88 U/L (ref 55–170)
CO2 SERPL-SCNC: 21 MMOL/L (ref 22–32)
CREAT SERPL-MCNC: 0.99 MG/DL (ref 0.66–1.25)
EOSINOPHIL # BLD AUTO: 100 /UL (ref 0–450)
EOSINOPHIL NFR BLD AUTO: 1.7 % (ref 2–4)
ESTIMATED GLOMERULAR FILT RATE: > 60 ML/MIN (ref 60–?)
GLOBULIN SER CALC-MCNC: 2.9 G/DL (ref 1.7–4.1)
GLUCOSE SERPL-MCNC: 182 MG/DL (ref 70–99)
HEMATOCRIT: 42.9 % (ref 41–53)
HEMOGLOBIN: 14.4 G/DL (ref 13.5–17.5)
HEMOLYSIS: < 15 (ref 0–50)
INR PPP: 1.6 (ref 0.9–1.3)
LYMPHOCYTES # SPEC AUTO: 2100 /UL (ref 1100–4500)
LYMPHOCYTES PERCENT AUTO: 27.2 % (ref 25–40)
MCH RBC QN AUTO: 30.6 PG (ref 26–34)
MCV RBC: 90.7 FL (ref 80–100)
MEAN CORPUSCULAR HGB CONC: 33.7 % (ref 30–36)
MONOCYTES # BLD AUTO: 700 /UL (ref 0–900)
MONOCYTES NFR BLD AUTO: 8.7 % (ref 3–14)
NEUTROPHILS # BLD AUTO: 4600 /UL (ref 1500–7000)
NEUTROPHILS NFR BLD AUTO: 61.2 % (ref 50–75)
PLATELET COUNT: 174 X10^3/UL (ref 150–400)
POTASSIUM SERPL-SCNC: 4 MMOL/L (ref 3.4–5.1)
PROT SERPL-MCNC: 7.3 G/DL (ref 6.3–8.2)
PROTHROMBIN TIME: 18.1 SECONDS (ref 9.4–12.5)
PTT PARTIAL THROMBOPLASTIN TIM: 35 SECONDS (ref 25.1–36.5)
RED BLOOD CELL COUNT: 4.72 X10^6/UL (ref 4.5–5.9)
RED CELL DISTRIBUTION WIDTH: 14.3 % (ref 11.6–14.8)
SODIUM SERPL-SCNC: 139 MMOL/L (ref 137–145)
TROPONIN I SERPL-MCNC: < 0.012 NG/ML (ref 0.01–0.03)
WHITE BLOOD CELL COUNT: 7.6 X10^3/UL (ref 4.5–11)

## 2025-06-09 PROCEDURE — 96360 HYDRATION IV INFUSION INIT: CPT

## 2025-06-09 PROCEDURE — 70496 CT ANGIOGRAPHY HEAD: CPT

## 2025-06-09 PROCEDURE — 93005 ELECTROCARDIOGRAM TRACING: CPT

## 2025-06-09 PROCEDURE — 36415 COLL VENOUS BLD VENIPUNCTURE: CPT

## 2025-06-09 PROCEDURE — 71045 X-RAY EXAM CHEST 1 VIEW: CPT

## 2025-06-09 PROCEDURE — 82550 ASSAY OF CK (CPK): CPT

## 2025-06-09 PROCEDURE — 70450 CT HEAD/BRAIN W/O DYE: CPT

## 2025-06-09 PROCEDURE — 80053 COMPREHEN METABOLIC PANEL: CPT

## 2025-06-09 PROCEDURE — 85610 PROTHROMBIN TIME: CPT

## 2025-06-09 PROCEDURE — 84484 ASSAY OF TROPONIN QUANT: CPT

## 2025-06-09 PROCEDURE — 92950 HEART/LUNG RESUSCITATION CPR: CPT

## 2025-06-09 PROCEDURE — 85025 COMPLETE CBC W/AUTO DIFF WBC: CPT

## 2025-06-09 PROCEDURE — 99284 EMERGENCY DEPT VISIT MOD MDM: CPT

## 2025-06-09 PROCEDURE — 99285 EMERGENCY DEPT VISIT HI MDM: CPT

## 2025-06-09 PROCEDURE — 70486 CT MAXILLOFACIAL W/O DYE: CPT

## 2025-06-09 PROCEDURE — 72125 CT NECK SPINE W/O DYE: CPT

## 2025-06-09 PROCEDURE — 85730 THROMBOPLASTIN TIME PARTIAL: CPT

## 2025-06-09 PROCEDURE — 99291 CRITICAL CARE FIRST HOUR: CPT

## 2025-06-09 PROCEDURE — 70498 CT ANGIOGRAPHY NECK: CPT
